# Patient Record
Sex: FEMALE | Race: WHITE | NOT HISPANIC OR LATINO | Employment: FULL TIME | URBAN - METROPOLITAN AREA
[De-identification: names, ages, dates, MRNs, and addresses within clinical notes are randomized per-mention and may not be internally consistent; named-entity substitution may affect disease eponyms.]

---

## 2022-11-09 LAB
FERRITIN: 11 UG/L (ref 8–150)
FOLATE: >23.3 NG/ML
HCT VFR BLD CALC: 36.8 % (ref 36–46)
HEMOGLOBIN: 11 G/DL (ref 12–16)
IRON SATURATION: 9 % (ref 25–45)
IRON: 42 UG/DL (ref 35–150)
TOTAL IRON BINDING CAPACITY: 453 UG/DL (ref 240–445)
VITAMIN B-12: 355 PG/ML (ref 211–911)

## 2023-08-29 ENCOUNTER — APPOINTMENT (OUTPATIENT)
Dept: PRIMARY CARE | Facility: CLINIC | Age: 43
End: 2023-08-29
Payer: COMMERCIAL

## 2023-08-31 ENCOUNTER — OFFICE VISIT (OUTPATIENT)
Dept: PRIMARY CARE | Facility: CLINIC | Age: 43
End: 2023-08-31
Payer: COMMERCIAL

## 2023-08-31 VITALS
SYSTOLIC BLOOD PRESSURE: 122 MMHG | WEIGHT: 254.4 LBS | HEIGHT: 68 IN | DIASTOLIC BLOOD PRESSURE: 83 MMHG | HEART RATE: 72 BPM | TEMPERATURE: 97.6 F | BODY MASS INDEX: 38.55 KG/M2

## 2023-08-31 DIAGNOSIS — Z09 HOSPITAL DISCHARGE FOLLOW-UP: Primary | ICD-10-CM

## 2023-08-31 DIAGNOSIS — F34.1 PERSISTENT DEPRESSIVE DISORDER: ICD-10-CM

## 2023-08-31 DIAGNOSIS — M32.9 LUPUS (MULTI): ICD-10-CM

## 2023-08-31 DIAGNOSIS — R42 VERTIGO: ICD-10-CM

## 2023-08-31 PROBLEM — M79.7 FIBROMYALGIA: Status: ACTIVE | Noted: 2018-04-19

## 2023-08-31 PROBLEM — R76.8 POSITIVE ANA (ANTINUCLEAR ANTIBODY): Status: ACTIVE | Noted: 2023-08-31

## 2023-08-31 PROBLEM — D64.9 ANEMIA: Status: ACTIVE | Noted: 2023-08-31

## 2023-08-31 PROBLEM — F32.A DEPRESSION: Status: ACTIVE | Noted: 2023-08-31

## 2023-08-31 PROCEDURE — 1036F TOBACCO NON-USER: CPT | Performed by: NURSE PRACTITIONER

## 2023-08-31 PROCEDURE — 99214 OFFICE O/P EST MOD 30 MIN: CPT | Performed by: NURSE PRACTITIONER

## 2023-08-31 RX ORDER — ONDANSETRON 4 MG/1
TABLET, ORALLY DISINTEGRATING ORAL AS NEEDED
COMMUNITY

## 2023-08-31 RX ORDER — HYDROXYZINE HYDROCHLORIDE 10 MG/1
TABLET, FILM COATED ORAL AS NEEDED
COMMUNITY
Start: 2023-06-26

## 2023-08-31 RX ORDER — DULOXETIN HYDROCHLORIDE 60 MG/1
60 CAPSULE, DELAYED RELEASE ORAL DAILY
COMMUNITY

## 2023-08-31 RX ORDER — ESTRADIOL 0.03 MG/D
FILM, EXTENDED RELEASE TRANSDERMAL
COMMUNITY
Start: 2021-09-15

## 2023-08-31 RX ORDER — DULOXETIN HYDROCHLORIDE 20 MG/1
30 CAPSULE, DELAYED RELEASE ORAL DAILY
COMMUNITY
End: 2024-01-19 | Stop reason: ALTCHOICE

## 2023-08-31 RX ORDER — OMEPRAZOLE 20 MG/1
20 CAPSULE, DELAYED RELEASE ORAL AS NEEDED
COMMUNITY
Start: 2019-04-25 | End: 2024-03-25 | Stop reason: SDUPTHER

## 2023-08-31 RX ORDER — PANTOPRAZOLE SODIUM 40 MG/1
TABLET, DELAYED RELEASE ORAL
COMMUNITY
End: 2023-12-20

## 2023-08-31 RX ORDER — MECLIZINE HYDROCHLORIDE 25 MG/1
25 TABLET ORAL AS NEEDED
COMMUNITY
Start: 2022-11-08 | End: 2024-01-19 | Stop reason: ALTCHOICE

## 2023-08-31 RX ORDER — LISDEXAMFETAMINE DIMESYLATE 50 MG/1
50 CAPSULE ORAL DAILY
COMMUNITY
Start: 2023-08-18 | End: 2024-04-19 | Stop reason: ALTCHOICE

## 2023-08-31 NOTE — PROGRESS NOTES
"Subjective   Patient ID: Marquis Skelton is a 43 y.o. female who presents for Follow-up (ER. Bp shot up to 256/190, fainted. Since then have a couple of episodes).    HPI Presents today for follow up on ER visit 8/23/2023.   Labs and imaging as noted.   Patient does have a medical marijuana card for ain associated with lupus.   Does see psychiatry for her other medications.   Went to urgent care because she felt dizzy and then was transfer to [facility] by ambulance because she had a syncopal episode. ,  EKG, blood work chest xray all normal.   Recently increased duloxetine to 40-80 mg  2-3 months ago  Has not to taken meclizine, has not needed it.   Has not taken hydroxyzine either  Has felt fine since this happened  Review of Systems   Constitutional: Negative.    Respiratory: Negative.     Cardiovascular: Negative.    Gastrointestinal: Negative.    Neurological:         As noted in HPI     Psychiatric/Behavioral:          As noted in HPI         Objective   /83   Pulse 72   Temp 36.4 °C (97.6 °F)   Ht 1.727 m (5' 8\")   Wt 115 kg (254 lb 6.4 oz)   BMI 38.68 kg/m²     Physical Exam  Constitutional:       Appearance: Normal appearance. She is obese.   HENT:      Right Ear: Tympanic membrane, ear canal and external ear normal.      Left Ear: Tympanic membrane, ear canal and external ear normal.   Cardiovascular:      Rate and Rhythm: Normal rate and regular rhythm.   Pulmonary:      Effort: Pulmonary effort is normal.      Breath sounds: Normal breath sounds.   Musculoskeletal:         General: Normal range of motion.   Neurological:      General: No focal deficit present.      Mental Status: She is alert.   Psychiatric:         Mood and Affect: Mood normal.         Behavior: Behavior normal.         Assessment/Plan   Problem List Items Addressed This Visit    None  Visit Diagnoses       Hospital discharge follow-up    -  Primary    Vertigo            Did discuss with patient interaction and " effects/interactions of medical marijuana on her there medications.

## 2023-09-01 ASSESSMENT — ENCOUNTER SYMPTOMS
CONSTITUTIONAL NEGATIVE: 1
CARDIOVASCULAR NEGATIVE: 1
RESPIRATORY NEGATIVE: 1
GASTROINTESTINAL NEGATIVE: 1

## 2023-10-17 ENCOUNTER — APPOINTMENT (OUTPATIENT)
Dept: RADIOLOGY | Facility: HOSPITAL | Age: 43
End: 2023-10-17
Payer: COMMERCIAL

## 2023-10-17 ENCOUNTER — TELEPHONE (OUTPATIENT)
Dept: PRIMARY CARE | Facility: CLINIC | Age: 43
End: 2023-10-17
Payer: COMMERCIAL

## 2023-10-17 ENCOUNTER — HOSPITAL ENCOUNTER (EMERGENCY)
Facility: HOSPITAL | Age: 43
Discharge: HOME | End: 2023-10-17
Attending: STUDENT IN AN ORGANIZED HEALTH CARE EDUCATION/TRAINING PROGRAM
Payer: COMMERCIAL

## 2023-10-17 VITALS
HEART RATE: 87 BPM | SYSTOLIC BLOOD PRESSURE: 143 MMHG | BODY MASS INDEX: 38.8 KG/M2 | TEMPERATURE: 98.1 F | OXYGEN SATURATION: 97 % | DIASTOLIC BLOOD PRESSURE: 78 MMHG | WEIGHT: 256 LBS | RESPIRATION RATE: 18 BRPM | HEIGHT: 68 IN

## 2023-10-17 DIAGNOSIS — R11.2 NAUSEA AND VOMITING, UNSPECIFIED VOMITING TYPE: Primary | ICD-10-CM

## 2023-10-17 DIAGNOSIS — R00.2 PALPITATIONS: ICD-10-CM

## 2023-10-17 LAB
ALBUMIN SERPL BCP-MCNC: 4 G/DL (ref 3.4–5)
ALP SERPL-CCNC: 82 U/L (ref 33–110)
ALT SERPL W P-5'-P-CCNC: 18 U/L (ref 7–45)
ANION GAP SERPL CALC-SCNC: 11 MMOL/L (ref 10–20)
AST SERPL W P-5'-P-CCNC: 17 U/L (ref 9–39)
BASOPHILS # BLD AUTO: 0.08 X10*3/UL (ref 0–0.1)
BASOPHILS NFR BLD AUTO: 0.8 %
BILIRUB SERPL-MCNC: 0.3 MG/DL (ref 0–1.2)
BUN SERPL-MCNC: 16 MG/DL (ref 6–23)
CALCIUM SERPL-MCNC: 9.3 MG/DL (ref 8.6–10.3)
CARDIAC TROPONIN I PNL SERPL HS: 3 NG/L (ref 0–13)
CHLORIDE SERPL-SCNC: 106 MMOL/L (ref 98–107)
CO2 SERPL-SCNC: 24 MMOL/L (ref 21–32)
CREAT SERPL-MCNC: 0.78 MG/DL (ref 0.5–1.05)
EOSINOPHIL # BLD AUTO: 0.25 X10*3/UL (ref 0–0.7)
EOSINOPHIL NFR BLD AUTO: 2.6 %
ERYTHROCYTE [DISTWIDTH] IN BLOOD BY AUTOMATED COUNT: 18.2 % (ref 11.5–14.5)
GFR SERPL CREATININE-BSD FRML MDRD: >90 ML/MIN/1.73M*2
GLUCOSE SERPL-MCNC: 88 MG/DL (ref 74–99)
HCT VFR BLD AUTO: 39.2 % (ref 36–46)
HGB BLD-MCNC: 12.2 G/DL (ref 12–16)
IMM GRANULOCYTES # BLD AUTO: 0.02 X10*3/UL (ref 0–0.7)
IMM GRANULOCYTES NFR BLD AUTO: 0.2 % (ref 0–0.9)
LYMPHOCYTES # BLD AUTO: 2.6 X10*3/UL (ref 1.2–4.8)
LYMPHOCYTES NFR BLD AUTO: 27.2 %
MCH RBC QN AUTO: 24.8 PG (ref 26–34)
MCHC RBC AUTO-ENTMCNC: 31.1 G/DL (ref 32–36)
MCV RBC AUTO: 80 FL (ref 80–100)
MONOCYTES # BLD AUTO: 0.72 X10*3/UL (ref 0.1–1)
MONOCYTES NFR BLD AUTO: 7.5 %
NEUTROPHILS # BLD AUTO: 5.9 X10*3/UL (ref 1.2–7.7)
NEUTROPHILS NFR BLD AUTO: 61.7 %
NRBC BLD-RTO: 0 /100 WBCS (ref 0–0)
PLATELET # BLD AUTO: 310 X10*3/UL (ref 150–450)
PMV BLD AUTO: 10.3 FL (ref 7.5–11.5)
POTASSIUM SERPL-SCNC: 3.9 MMOL/L (ref 3.5–5.3)
PROT SERPL-MCNC: 6.6 G/DL (ref 6.4–8.2)
RBC # BLD AUTO: 4.91 X10*6/UL (ref 4–5.2)
SODIUM SERPL-SCNC: 137 MMOL/L (ref 136–145)
WBC # BLD AUTO: 9.6 X10*3/UL (ref 4.4–11.3)

## 2023-10-17 PROCEDURE — 84484 ASSAY OF TROPONIN QUANT: CPT

## 2023-10-17 PROCEDURE — 80053 COMPREHEN METABOLIC PANEL: CPT

## 2023-10-17 PROCEDURE — 71046 X-RAY EXAM CHEST 2 VIEWS: CPT | Performed by: RADIOLOGY

## 2023-10-17 PROCEDURE — 99283 EMERGENCY DEPT VISIT LOW MDM: CPT | Mod: 25

## 2023-10-17 PROCEDURE — 71046 X-RAY EXAM CHEST 2 VIEWS: CPT

## 2023-10-17 PROCEDURE — 99284 EMERGENCY DEPT VISIT MOD MDM: CPT | Performed by: STUDENT IN AN ORGANIZED HEALTH CARE EDUCATION/TRAINING PROGRAM

## 2023-10-17 PROCEDURE — 36415 COLL VENOUS BLD VENIPUNCTURE: CPT

## 2023-10-17 PROCEDURE — 85025 COMPLETE CBC W/AUTO DIFF WBC: CPT

## 2023-10-17 ASSESSMENT — COLUMBIA-SUICIDE SEVERITY RATING SCALE - C-SSRS
1. IN THE PAST MONTH, HAVE YOU WISHED YOU WERE DEAD OR WISHED YOU COULD GO TO SLEEP AND NOT WAKE UP?: NO
2. HAVE YOU ACTUALLY HAD ANY THOUGHTS OF KILLING YOURSELF?: NO
6. HAVE YOU EVER DONE ANYTHING, STARTED TO DO ANYTHING, OR PREPARED TO DO ANYTHING TO END YOUR LIFE?: NO

## 2023-10-17 NOTE — ED PROVIDER NOTES
"  Chief Complaint   Patient presents with    Nausea    Chest Pain     STATES FEELS \"SHAKY, NAUSEA, CHEST PRESSURE AND THROAT TIGHTNESS FEELING\" PT IS SPEAKING IN FULL SENTNECES       43-year-old female arrives to the emergency department with a chief complaint of intermittent episodes of palpitations and chest pressure.  Patient states that 2-3 times a week she has an abrupt onset of where it feels as if her heart is going to beat out of her chest, she has a heaviness/pressure on her chest, and states that she has a difficult time catching her breath at those times.  States that it can last from 1 to 5 hours, that nothing in particular brings it on, the patient is not currently taking any medications for this.  The patient has followed up with her primary care provider as well as her psychiatrist in relation to this, she called her primary care today while she was having 1 of these episodes and was told to come to the emergency department.  The patient upon initial assessment states that the episode has completely resolved.  The patient is medicated on a daily basis for anxiety/depression, her dose was adjusted 4 months ago, patient has a as needed anxiety medication that she does not take.  When the patient's symptoms resolved, they completely resolve with no residual and they are not persistent.  Patient denies any other symptoms or complaints, patient is tearful upon initial assessment.  Patient denies any SI or HI.      History provided by:  Patient   used: No         PmHx, PsHx, Allergies, Family Hx, social Hx reviewed as documented    A complete 10 point review of systems was performed and is negative except for as mentioned in the HPI.    Physical Exam:    General: Patient is AAOx3, appears well developed, well nourished, is a good historian, answers questions appropriately    HEENT: head normocephalic, atraumatic, PERRLA, EOMs intact, oropharynx without erythema or exudate, buccal mucosa " intact without lesions, TMs unremarkable, nose is patent bilateral    Neck: supple, full ROM, negative for lymphadenopathy, JVD, thyromegaly, tracheal deviation, nuccal rigidity    Pulmonary: CTAB, no accessory muscle use, able to speak full clear sentences    Cardiac: HRRR, no murmurs, rubs or gallops    GI: soft, non-tender, non-distended, BS + x 4, no masses or organomegaly, no guarding or CVA tenderness noted, negative aguilar's, mcburney's    Musculoskeletal: full weight bearing, BRASWELL, no joint effusions, clubbing or edema noted    Skin: intact, no lesions or rashes noted, turgor is good.    Neuro: patient follow commands, cranial nerves 2-12 grossly intact, motor strengths 5/5 upper and lower extremities, DTR's and sensation are symmetrical. No focal deficits.    Rectal/: No urinary burning, urgency, change in frequency.  Patient has no rectal complaints        Medical Decision Making  This patient was seen, treated, and evaluated in conjunction with Dr. Imtiaz Petty    My initial impression for this patient is that she is having mild and repeating anxiety attacks/stress responses.  However other consideration for this patient would be atypical ACS, electrolyte abnormalities, blood count abnormalities.     EKG, diagnostic blood work, chest x-ray will be used to further evaluate    The patient's EKG was done at 1624 on 10/17, the EKG was interpreted by the attending physician as no STEMI, the EKG was interpreted by me as sinus rhythm with a rate of 88 a MI interval of 141 and a QTc of 434, the EKG is without acute ST abnormality or ectopy    The attending physician to further evaluate and disposition patient            The patient has had the following imaging during this ER visit: XR CHEST 1 VIEW     Patient History   Past Medical History:   Diagnosis Date    Personal history of other diseases of the female genital tract 06/10/2021    History of endometriosis    Personal history of other diseases of  "the musculoskeletal system and connective tissue 06/10/2021    History of fibromyalgia    Personal history of peptic ulcer disease 06/10/2021    History of gastric ulcer     Past Surgical History:   Procedure Laterality Date    OTHER SURGICAL HISTORY  06/10/2021    Hysterectomy    OTHER SURGICAL HISTORY  06/10/2021    Laparoscopy    OTHER SURGICAL HISTORY  06/10/2021    Gallbladder surgery    OTHER SURGICAL HISTORY  06/10/2021    Nose surgery     Family History   Problem Relation Name Age of Onset    Other (cva) Mother      Diabetes Mother      Thyroid disease Mother      Pancreatic cancer Father       Social History     Tobacco Use    Smoking status: Never     Passive exposure: Never    Smokeless tobacco: Never   Vaping Use    Vaping Use: Never used   Substance Use Topics    Alcohol use: Not on file    Drug use: Not on file       ED Triage Vitals   Temp Heart Rate Resp BP   10/17/23 1622 10/17/23 1623 10/17/23 1623 10/17/23 1623   36.7 °C (98.1 °F) 87 18 143/78      SpO2 Temp Source Heart Rate Source Patient Position   10/17/23 1623 10/17/23 1622 -- --   97 % Temporal        BP Location FiO2 (%)     10/17/23 1623 --     Left arm        Vitals:    10/17/23 1622 10/17/23 1623   BP:  143/78   BP Location:  Left arm   Pulse:  87   Resp:  18   Temp: 36.7 °C (98.1 °F)    TempSrc: Temporal    SpO2:  97%   Weight:  116 kg (256 lb)   Height:  1.727 m (5' 8\")               YOSEPH Ross-CNP  10/17/23 1829    "

## 2023-10-17 NOTE — TELEPHONE ENCOUNTER
PT called regarding HBP and and heart racing.  She feels pressure in her heart even upon swallowing.  She also feels very weak.  She told me that as she is sitting talking to me she is very shaky.  I spoke with Dr. Kimbrough, she said with the symptoms the patient is having she needs to go to the ER, she should call 911.  This was expressed to the patient who was concerned if she went to ER they were going to just tell her to follow up with her PCP - so I did go a head and schedule the patient with Carlota Saleh so that she would have an appt.      Of note, patient was seen in the ER in August for same/similar symptoms and was told she had vertigo.

## 2023-10-23 ENCOUNTER — TELEPHONE (OUTPATIENT)
Dept: PRIMARY CARE | Facility: CLINIC | Age: 43
End: 2023-10-23

## 2023-10-23 ENCOUNTER — APPOINTMENT (OUTPATIENT)
Dept: PRIMARY CARE | Facility: CLINIC | Age: 43
End: 2023-10-23
Payer: COMMERCIAL

## 2023-10-23 PROBLEM — F32.9 MAJOR DEPRESSION: Status: ACTIVE | Noted: 2023-08-31

## 2023-10-23 NOTE — TELEPHONE ENCOUNTER
Patient went to ER and all tests came back good. They believe her high heart rate is from anxiety. Her therapist put her on Hydroxyzine 10mg to see how she does.  She is having all over body pain (a 6 or 7 on pain level) from her lupus.

## 2023-12-08 ENCOUNTER — APPOINTMENT (OUTPATIENT)
Dept: PRIMARY CARE | Facility: CLINIC | Age: 43
End: 2023-12-08
Payer: COMMERCIAL

## 2023-12-20 DIAGNOSIS — K21.9 GASTROESOPHAGEAL REFLUX DISEASE WITHOUT ESOPHAGITIS: Primary | ICD-10-CM

## 2023-12-20 RX ORDER — PANTOPRAZOLE SODIUM 40 MG/1
40 TABLET, DELAYED RELEASE ORAL
Qty: 90 TABLET | Refills: 3 | Status: SHIPPED | OUTPATIENT
Start: 2023-12-20 | End: 2024-03-26 | Stop reason: SDUPTHER

## 2024-01-19 ENCOUNTER — OFFICE VISIT (OUTPATIENT)
Dept: PRIMARY CARE | Facility: CLINIC | Age: 44
End: 2024-01-19
Payer: COMMERCIAL

## 2024-01-19 VITALS
HEIGHT: 68 IN | DIASTOLIC BLOOD PRESSURE: 80 MMHG | WEIGHT: 264 LBS | BODY MASS INDEX: 40.01 KG/M2 | SYSTOLIC BLOOD PRESSURE: 120 MMHG | HEART RATE: 75 BPM

## 2024-01-19 DIAGNOSIS — R40.0 DAYTIME SOMNOLENCE: ICD-10-CM

## 2024-01-19 DIAGNOSIS — Z13.220 LIPID SCREENING: ICD-10-CM

## 2024-01-19 DIAGNOSIS — R00.2 PALPITATIONS: Primary | ICD-10-CM

## 2024-01-19 DIAGNOSIS — R06.83 HABITUAL SNORING: ICD-10-CM

## 2024-01-19 DIAGNOSIS — Z00.00 HEALTHCARE MAINTENANCE: ICD-10-CM

## 2024-01-19 PROBLEM — H61.20 IMPACTED CERUMEN: Status: RESOLVED | Noted: 2024-01-19 | Resolved: 2024-01-19

## 2024-01-19 PROBLEM — R29.898 DECREASED RANGE OF MOTION OF NECK: Status: RESOLVED | Noted: 2024-01-19 | Resolved: 2024-01-19

## 2024-01-19 PROBLEM — S39.012A STRAIN OF LUMBAR REGION: Status: RESOLVED | Noted: 2024-01-19 | Resolved: 2024-01-19

## 2024-01-19 PROBLEM — M54.50 LOW BACK PAIN, UNSPECIFIED: Status: RESOLVED | Noted: 2022-08-01 | Resolved: 2024-01-19

## 2024-01-19 PROBLEM — R23.2 FLUSHING: Status: RESOLVED | Noted: 2024-01-19 | Resolved: 2024-01-19

## 2024-01-19 PROBLEM — M62.838 MUSCLE SPASMS OF NECK: Status: RESOLVED | Noted: 2024-01-19 | Resolved: 2024-01-19

## 2024-01-19 PROBLEM — R13.10 DYSPHAGIA: Status: RESOLVED | Noted: 2024-01-19 | Resolved: 2024-01-19

## 2024-01-19 PROBLEM — L93.0 LUPUS ERYTHEMATOSUS: Status: ACTIVE | Noted: 2019-09-24

## 2024-01-19 PROBLEM — R11.0 NAUSEA: Status: RESOLVED | Noted: 2023-08-23 | Resolved: 2024-01-19

## 2024-01-19 PROBLEM — M25.50 ARTHRALGIA OF MULTIPLE JOINTS: Status: RESOLVED | Noted: 2024-01-19 | Resolved: 2024-01-19

## 2024-01-19 PROBLEM — E28.39 DECREASED ESTROGEN LEVEL: Status: ACTIVE | Noted: 2024-01-19

## 2024-01-19 PROBLEM — K21.9 GASTROESOPHAGEAL REFLUX DISEASE: Status: ACTIVE | Noted: 2024-01-19

## 2024-01-19 PROBLEM — H92.03 OTALGIA OF BOTH EARS: Status: RESOLVED | Noted: 2024-01-19 | Resolved: 2024-01-19

## 2024-01-19 PROBLEM — R42 DIZZINESS: Status: RESOLVED | Noted: 2023-08-23 | Resolved: 2024-01-19

## 2024-01-19 PROBLEM — M54.50 LOW BACK PAIN: Status: RESOLVED | Noted: 2024-01-19 | Resolved: 2024-01-19

## 2024-01-19 PROBLEM — K92.1 MELENA: Status: RESOLVED | Noted: 2024-01-19 | Resolved: 2024-01-19

## 2024-01-19 PROBLEM — E66.01 MORBID OBESITY (MULTI): Status: ACTIVE | Noted: 2024-01-19

## 2024-01-19 PROBLEM — R10.9 ACUTE ABDOMINAL PAIN: Status: RESOLVED | Noted: 2019-09-24 | Resolved: 2024-01-19

## 2024-01-19 PROBLEM — R76.8 POSITIVE ANTINUCLEAR ANTIBODY: Status: ACTIVE | Noted: 2020-07-13

## 2024-01-19 PROCEDURE — 99214 OFFICE O/P EST MOD 30 MIN: CPT | Performed by: FAMILY MEDICINE

## 2024-01-19 PROCEDURE — 1036F TOBACCO NON-USER: CPT | Performed by: FAMILY MEDICINE

## 2024-01-19 RX ORDER — DULOXETIN HYDROCHLORIDE 30 MG/1
30 CAPSULE, DELAYED RELEASE ORAL DAILY
COMMUNITY

## 2024-01-19 RX ORDER — EVENING PRIMROSE OIL 500 MG
CAPSULE ORAL DAILY
COMMUNITY

## 2024-01-19 ASSESSMENT — ANXIETY QUESTIONNAIRES
3. WORRYING TOO MUCH ABOUT DIFFERENT THINGS: MORE THAN HALF THE DAYS
IF YOU CHECKED OFF ANY PROBLEMS ON THIS QUESTIONNAIRE, HOW DIFFICULT HAVE THESE PROBLEMS MADE IT FOR YOU TO DO YOUR WORK, TAKE CARE OF THINGS AT HOME, OR GET ALONG WITH OTHER PEOPLE: VERY DIFFICULT
5. BEING SO RESTLESS THAT IT IS HARD TO SIT STILL: MORE THAN HALF THE DAYS
1. FEELING NERVOUS, ANXIOUS, OR ON EDGE: SEVERAL DAYS
6. BECOMING EASILY ANNOYED OR IRRITABLE: NOT AT ALL
GAD7 TOTAL SCORE: 9
2. NOT BEING ABLE TO STOP OR CONTROL WORRYING: MORE THAN HALF THE DAYS
7. FEELING AFRAID AS IF SOMETHING AWFUL MIGHT HAPPEN: NOT AT ALL
4. TROUBLE RELAXING: MORE THAN HALF THE DAYS

## 2024-01-19 ASSESSMENT — COLUMBIA-SUICIDE SEVERITY RATING SCALE - C-SSRS
1. IN THE PAST MONTH, HAVE YOU WISHED YOU WERE DEAD OR WISHED YOU COULD GO TO SLEEP AND NOT WAKE UP?: YES
2. HAVE YOU ACTUALLY HAD ANY THOUGHTS OF KILLING YOURSELF?: NO
6. HAVE YOU EVER DONE ANYTHING, STARTED TO DO ANYTHING, OR PREPARED TO DO ANYTHING TO END YOUR LIFE?: NO

## 2024-01-19 ASSESSMENT — PATIENT HEALTH QUESTIONNAIRE - PHQ9
SUM OF ALL RESPONSES TO PHQ QUESTIONS 1-9: 20
7. TROUBLE CONCENTRATING ON THINGS, SUCH AS READING THE NEWSPAPER OR WATCHING TELEVISION: NEARLY EVERY DAY
3. TROUBLE FALLING OR STAYING ASLEEP OR SLEEPING TOO MUCH: NEARLY EVERY DAY
8. MOVING OR SPEAKING SO SLOWLY THAT OTHER PEOPLE COULD HAVE NOTICED. OR THE OPPOSITE, BEING SO FIGETY OR RESTLESS THAT YOU HAVE BEEN MOVING AROUND A LOT MORE THAN USUAL: SEVERAL DAYS
5. POOR APPETITE OR OVEREATING: NOT AT ALL
10. IF YOU CHECKED OFF ANY PROBLEMS, HOW DIFFICULT HAVE THESE PROBLEMS MADE IT FOR YOU TO DO YOUR WORK, TAKE CARE OF THINGS AT HOME, OR GET ALONG WITH OTHER PEOPLE: SOMEWHAT DIFFICULT
4. FEELING TIRED OR HAVING LITTLE ENERGY: NEARLY EVERY DAY
1. LITTLE INTEREST OR PLEASURE IN DOING THINGS: NEARLY EVERY DAY
2. FEELING DOWN, DEPRESSED OR HOPELESS: NEARLY EVERY DAY
6. FEELING BAD ABOUT YOURSELF - OR THAT YOU ARE A FAILURE OR HAVE LET YOURSELF OR YOUR FAMILY DOWN: NEARLY EVERY DAY
SUM OF ALL RESPONSES TO PHQ9 QUESTIONS 1 AND 2: 6
9. THOUGHTS THAT YOU WOULD BE BETTER OFF DEAD, OR OF HURTING YOURSELF: SEVERAL DAYS

## 2024-01-19 ASSESSMENT — ENCOUNTER SYMPTOMS
OCCASIONAL FEELINGS OF UNSTEADINESS: 0
LOSS OF SENSATION IN FEET: 0
DEPRESSION: 1

## 2024-01-19 NOTE — PATIENT INSTRUCTIONS
1. Palpitations  - Holter or Event Cardiac Monitor; Future    2. Habitual snoring  - Home sleep apnea test (HSAT); Future    3. Daytime somnolence  - Home sleep apnea test (HSAT); Future    4. Lipid screening  - Lipid Panel; Future    5. Healthcare maintenance  - TSH with reflex to Free T4 if abnormal; Future  - Comprehensive Metabolic Panel; Future  - CBC and Auto Differential; Future

## 2024-01-19 NOTE — PROGRESS NOTES
"Subjective   Patient ID: Marquis Skelton is a 43 y.o. female who presents for [facility] (Establish care/).    42 y/o female presents to establish care    Has been experiencing episodes of palpitations with elevated blood pressure with no prior history of hypertension. Has been to ER twice for these episodes most recently 10/17/2023 with negative cardiac work up. Does report these episodes occur about twice a week with no specific triggers. Endorses accompanying chest tightness and shortness of breath lasting anywhere from several minutes to as much as 5 hours and symptoms resolve on their own. She was instructed to try hydroxyzine when experiencing these episodes which she has done a couple of times but does not feel it made any difference. Does follow with psychiatry every 1-3 months, counselor every 2 weeks which she started over a year ago due to past traumas which have been helping. Most recently has been dealing with increase in life stressors so this has been hard on her, she is working with her counselor on this. Does have thoughts that people would be better off if she was not here but does not have any suicidal ideation or plan. States she would never do anything like that to her children.     Also endorses daytime fatigue and does not feel she is rested upon waking, report frequent headaches stating she gets them most days.  has stated that she snores.     Is due for fasting blood work.          Review of Systems   All other systems reviewed and are negative.      Objective   /80 (BP Location: Right arm, Patient Position: Sitting)   Pulse 75   Ht 1.727 m (5' 8\")   Wt 120 kg (264 lb)   BMI 40.14 kg/m²     Physical Exam  Constitutional:       Appearance: Normal appearance.   HENT:      Head: Normocephalic and atraumatic.   Cardiovascular:      Rate and Rhythm: Normal rate and regular rhythm.      Heart sounds: No murmur heard.     No gallop.   Pulmonary:      Effort: Pulmonary effort " is normal. No respiratory distress.      Breath sounds: Normal breath sounds.   Abdominal:      General: Bowel sounds are normal. There is no distension.      Tenderness: There is no abdominal tenderness.   Musculoskeletal:         General: Normal range of motion.   Skin:     General: Skin is warm and dry.      Findings: No lesion or rash.   Neurological:      General: No focal deficit present.      Mental Status: She is alert and oriented to person, place, and time. Mental status is at baseline.   Psychiatric:         Mood and Affect: Mood normal. Affect is tearful.         Behavior: Behavior normal.         Assessment/Plan   Problem List Items Addressed This Visit             ICD-10-CM    RESOLVED: Palpitations - Primary R00.2    Relevant Orders    Holter or Event Cardiac Monitor    Follow Up In [facility] - PCP - Established     Other Visit Diagnoses         Codes    Habitual snoring     R06.83    Relevant Orders    Home sleep apnea test (HSAT)    Daytime somnolence     R40.0    Relevant Orders    Home sleep apnea test (HSAT)    Follow Up In [facility] - PCP - Established    Lipid screening     Z13.220    Relevant Orders    Lipid Panel    Follow Up In [facility] - PCP - Established    Healthcare maintenance     Z00.00    Relevant Orders    TSH with reflex to Free T4 if abnormal    Comprehensive Metabolic Panel    CBC and Auto Differential

## 2024-01-30 ENCOUNTER — HOSPITAL ENCOUNTER (OUTPATIENT)
Dept: CARDIOLOGY | Facility: HOSPITAL | Age: 44
Discharge: HOME | End: 2024-01-30
Payer: COMMERCIAL

## 2024-01-30 DIAGNOSIS — R00.2 PALPITATIONS: ICD-10-CM

## 2024-01-30 PROCEDURE — 93244 EXT ECG>48HR<7D REV&INTERPJ: CPT | Performed by: STUDENT IN AN ORGANIZED HEALTH CARE EDUCATION/TRAINING PROGRAM

## 2024-01-30 PROCEDURE — 93242 EXT ECG>48HR<7D RECORDING: CPT

## 2024-02-05 ENCOUNTER — APPOINTMENT (OUTPATIENT)
Dept: SLEEP MEDICINE | Facility: CLINIC | Age: 44
End: 2024-02-05
Payer: COMMERCIAL

## 2024-02-08 ENCOUNTER — LAB (OUTPATIENT)
Dept: LAB | Facility: LAB | Age: 44
End: 2024-02-08
Payer: COMMERCIAL

## 2024-02-08 ENCOUNTER — CLINICAL SUPPORT (OUTPATIENT)
Dept: SLEEP MEDICINE | Facility: CLINIC | Age: 44
End: 2024-02-08
Payer: COMMERCIAL

## 2024-02-08 DIAGNOSIS — Z13.220 LIPID SCREENING: ICD-10-CM

## 2024-02-08 DIAGNOSIS — G47.33 OBSTRUCTIVE SLEEP APNEA (ADULT) (PEDIATRIC): ICD-10-CM

## 2024-02-08 DIAGNOSIS — Z00.00 HEALTHCARE MAINTENANCE: ICD-10-CM

## 2024-02-08 DIAGNOSIS — R40.0 DAYTIME SOMNOLENCE: ICD-10-CM

## 2024-02-08 DIAGNOSIS — R06.83 HABITUAL SNORING: ICD-10-CM

## 2024-02-08 LAB
ALBUMIN SERPL BCP-MCNC: 4.1 G/DL (ref 3.4–5)
ALP SERPL-CCNC: 80 U/L (ref 33–110)
ALT SERPL W P-5'-P-CCNC: 20 U/L (ref 7–45)
ANION GAP SERPL CALC-SCNC: 14 MMOL/L (ref 10–20)
AST SERPL W P-5'-P-CCNC: 20 U/L (ref 9–39)
BASOPHILS # BLD AUTO: 0.07 X10*3/UL (ref 0–0.1)
BASOPHILS NFR BLD AUTO: 0.9 %
BILIRUB SERPL-MCNC: 0.4 MG/DL (ref 0–1.2)
BUN SERPL-MCNC: 13 MG/DL (ref 6–23)
CALCIUM SERPL-MCNC: 9.1 MG/DL (ref 8.6–10.3)
CHLORIDE SERPL-SCNC: 104 MMOL/L (ref 98–107)
CHOLEST SERPL-MCNC: 259 MG/DL (ref 0–199)
CHOLESTEROL/HDL RATIO: 3.8
CO2 SERPL-SCNC: 24 MMOL/L (ref 21–32)
CREAT SERPL-MCNC: 0.82 MG/DL (ref 0.5–1.05)
EGFRCR SERPLBLD CKD-EPI 2021: >90 ML/MIN/1.73M*2
EOSINOPHIL # BLD AUTO: 0.35 X10*3/UL (ref 0–0.7)
EOSINOPHIL NFR BLD AUTO: 4.5 %
ERYTHROCYTE [DISTWIDTH] IN BLOOD BY AUTOMATED COUNT: 18.4 % (ref 11.5–14.5)
GLUCOSE SERPL-MCNC: 82 MG/DL (ref 74–99)
HCT VFR BLD AUTO: 41.5 % (ref 36–46)
HDLC SERPL-MCNC: 68.3 MG/DL
HGB BLD-MCNC: 12.6 G/DL (ref 12–16)
IMM GRANULOCYTES # BLD AUTO: 0.04 X10*3/UL (ref 0–0.7)
IMM GRANULOCYTES NFR BLD AUTO: 0.5 % (ref 0–0.9)
LDLC SERPL CALC-MCNC: 163 MG/DL
LYMPHOCYTES # BLD AUTO: 2.11 X10*3/UL (ref 1.2–4.8)
LYMPHOCYTES NFR BLD AUTO: 27.4 %
MCH RBC QN AUTO: 24.8 PG (ref 26–34)
MCHC RBC AUTO-ENTMCNC: 30.4 G/DL (ref 32–36)
MCV RBC AUTO: 82 FL (ref 80–100)
MONOCYTES # BLD AUTO: 0.5 X10*3/UL (ref 0.1–1)
MONOCYTES NFR BLD AUTO: 6.5 %
NEUTROPHILS # BLD AUTO: 4.63 X10*3/UL (ref 1.2–7.7)
NEUTROPHILS NFR BLD AUTO: 60.2 %
NON HDL CHOLESTEROL: 191 MG/DL (ref 0–149)
NRBC BLD-RTO: 0 /100 WBCS (ref 0–0)
PLATELET # BLD AUTO: 400 X10*3/UL (ref 150–450)
POTASSIUM SERPL-SCNC: 4.8 MMOL/L (ref 3.5–5.3)
PROT SERPL-MCNC: 7 G/DL (ref 6.4–8.2)
RBC # BLD AUTO: 5.08 X10*6/UL (ref 4–5.2)
SODIUM SERPL-SCNC: 137 MMOL/L (ref 136–145)
TRIGL SERPL-MCNC: 137 MG/DL (ref 0–149)
TSH SERPL-ACNC: 1.47 MIU/L (ref 0.44–3.98)
VLDL: 27 MG/DL (ref 0–40)
WBC # BLD AUTO: 7.7 X10*3/UL (ref 4.4–11.3)

## 2024-02-08 PROCEDURE — 95806 SLEEP STUDY UNATT&RESP EFFT: CPT | Performed by: PSYCHIATRY & NEUROLOGY

## 2024-02-08 PROCEDURE — 84443 ASSAY THYROID STIM HORMONE: CPT

## 2024-02-08 PROCEDURE — 85025 COMPLETE CBC W/AUTO DIFF WBC: CPT

## 2024-02-08 PROCEDURE — 80061 LIPID PANEL: CPT

## 2024-02-08 PROCEDURE — 80053 COMPREHEN METABOLIC PANEL: CPT

## 2024-02-08 PROCEDURE — 36415 COLL VENOUS BLD VENIPUNCTURE: CPT

## 2024-02-08 NOTE — PROGRESS NOTES
Type of Study: HOME SLEEP STUDY - NOMAD     The patient received equipment and instructions for use of the [employer] [facility] Nomad HSAT device. The patient was instructed how to apply the effort belts, cannula, thermistor. It was also explained how the Nomad and oximeter components work.  The patient was asked to record their sleep for an 8-hour period.     The patient was informed of their responsibility for the device and acknowledged this by signing the HSAT device contract. The patient was asked to return the device on 02/09/2024 by 10 AM to [facility] Respiratory Therapy department.     The patient was instructed to call 911 as usual for any medical- emergencies while at home.

## 2024-02-13 NOTE — RESULT ENCOUNTER NOTE
Sleep study is consistent with severe sleep apnea, it is recommended that you get an in lab sleep study done so that we can obtain your sleep machine settings. This can be done in [facility] sleep lab. Just let me know if you are agreeable to this and I will get the order in.

## 2024-02-14 ENCOUNTER — TELEPHONE (OUTPATIENT)
Dept: PRIMARY CARE | Facility: CLINIC | Age: 44
End: 2024-02-14
Payer: COMMERCIAL

## 2024-02-14 DIAGNOSIS — Z13.6 SCREENING FOR CARDIOVASCULAR CONDITION: Primary | ICD-10-CM

## 2024-02-14 DIAGNOSIS — G47.33 OSA (OBSTRUCTIVE SLEEP APNEA): Primary | ICD-10-CM

## 2024-02-14 NOTE — TELEPHONE ENCOUNTER
----- Message from Odilia Hernandez APRN-CNP sent at 2/13/2024 10:19 AM EST -----  Sleep study is consistent with severe sleep apnea, it is recommended that you get an in lab sleep study done so that we can obtain your sleep machine settings. This can be done in [facility] sleep lab. Just let me know if you are agreeable to this and I will get the order in.

## 2024-02-14 NOTE — TELEPHONE ENCOUNTER
Odilia Hernandez, APRN-CHERISE Robin MA  Caller: Unspecified (Today, 10:28 AM)  Sleep study ordered

## 2024-02-14 NOTE — TELEPHONE ENCOUNTER
Patient notified   Tolerating oral intake and sitting up on side of bed. Discharge instructions given to patient and fiance. Verbalize understanding. Awaiting ride. No complaints.

## 2024-02-14 NOTE — TELEPHONE ENCOUNTER
Patient notified and is willing to do whatever is necessary, also asked about lab results please advise

## 2024-02-14 NOTE — RESULT ENCOUNTER NOTE
Cholesterol was a bit high- for now I recommend lifestyle measures to improve cholesterol which include regular exercise (150 minutes of activity that produces sweat and increases heart rate per week). Healthy diet low in fat, low cholesterol. I would consider a CT cardiac scoring screening to assess whether there is plaque build up in the arteries of your heart. All other blood work is normal.

## 2024-02-29 ENCOUNTER — HOSPITAL ENCOUNTER (EMERGENCY)
Facility: HOSPITAL | Age: 44
Discharge: HOME | End: 2024-02-29
Attending: EMERGENCY MEDICINE
Payer: COMMERCIAL

## 2024-02-29 ENCOUNTER — TELEPHONE (OUTPATIENT)
Dept: PRIMARY CARE | Facility: CLINIC | Age: 44
End: 2024-02-29
Payer: COMMERCIAL

## 2024-02-29 ENCOUNTER — APPOINTMENT (OUTPATIENT)
Dept: RADIOLOGY | Facility: HOSPITAL | Age: 44
End: 2024-02-29
Payer: COMMERCIAL

## 2024-02-29 ENCOUNTER — APPOINTMENT (OUTPATIENT)
Dept: CARDIOLOGY | Facility: HOSPITAL | Age: 44
End: 2024-02-29
Payer: COMMERCIAL

## 2024-02-29 VITALS
HEIGHT: 68 IN | DIASTOLIC BLOOD PRESSURE: 98 MMHG | RESPIRATION RATE: 18 BRPM | BODY MASS INDEX: 40.16 KG/M2 | WEIGHT: 265 LBS | HEART RATE: 80 BPM | TEMPERATURE: 98 F | OXYGEN SATURATION: 99 % | SYSTOLIC BLOOD PRESSURE: 158 MMHG

## 2024-02-29 DIAGNOSIS — R07.9 CHEST PAIN, UNSPECIFIED TYPE: Primary | ICD-10-CM

## 2024-02-29 LAB
ANION GAP SERPL CALC-SCNC: 12 MMOL/L (ref 10–20)
BASOPHILS # BLD AUTO: 0.09 X10*3/UL (ref 0–0.1)
BASOPHILS NFR BLD AUTO: 1.1 %
BNP SERPL-MCNC: 11 PG/ML (ref 0–99)
BUN SERPL-MCNC: 17 MG/DL (ref 6–23)
CALCIUM SERPL-MCNC: 9.2 MG/DL (ref 8.6–10.3)
CARDIAC TROPONIN I PNL SERPL HS: <3 NG/L (ref 0–13)
CARDIAC TROPONIN I PNL SERPL HS: <3 NG/L (ref 0–13)
CHLORIDE SERPL-SCNC: 102 MMOL/L (ref 98–107)
CO2 SERPL-SCNC: 27 MMOL/L (ref 21–32)
CREAT SERPL-MCNC: 0.84 MG/DL (ref 0.5–1.05)
EGFRCR SERPLBLD CKD-EPI 2021: 88 ML/MIN/1.73M*2
EOSINOPHIL # BLD AUTO: 0.29 X10*3/UL (ref 0–0.7)
EOSINOPHIL NFR BLD AUTO: 3.5 %
ERYTHROCYTE [DISTWIDTH] IN BLOOD BY AUTOMATED COUNT: 17.9 % (ref 11.5–14.5)
GLUCOSE SERPL-MCNC: 87 MG/DL (ref 74–99)
HCT VFR BLD AUTO: 41 % (ref 36–46)
HGB BLD-MCNC: 13.3 G/DL (ref 12–16)
IMM GRANULOCYTES # BLD AUTO: 0.03 X10*3/UL (ref 0–0.7)
IMM GRANULOCYTES NFR BLD AUTO: 0.4 % (ref 0–0.9)
LYMPHOCYTES # BLD AUTO: 2.51 X10*3/UL (ref 1.2–4.8)
LYMPHOCYTES NFR BLD AUTO: 30.2 %
MAGNESIUM SERPL-MCNC: 1.79 MG/DL (ref 1.6–2.4)
MCH RBC QN AUTO: 25.7 PG (ref 26–34)
MCHC RBC AUTO-ENTMCNC: 32.4 G/DL (ref 32–36)
MCV RBC AUTO: 79 FL (ref 80–100)
MONOCYTES # BLD AUTO: 0.52 X10*3/UL (ref 0.1–1)
MONOCYTES NFR BLD AUTO: 6.3 %
NEUTROPHILS # BLD AUTO: 4.86 X10*3/UL (ref 1.2–7.7)
NEUTROPHILS NFR BLD AUTO: 58.5 %
NRBC BLD-RTO: 0 /100 WBCS (ref 0–0)
PLATELET # BLD AUTO: 342 X10*3/UL (ref 150–450)
POTASSIUM SERPL-SCNC: 4.1 MMOL/L (ref 3.5–5.3)
RBC # BLD AUTO: 5.17 X10*6/UL (ref 4–5.2)
SODIUM SERPL-SCNC: 137 MMOL/L (ref 136–145)
TSH SERPL-ACNC: 2.69 MIU/L (ref 0.44–3.98)
WBC # BLD AUTO: 8.3 X10*3/UL (ref 4.4–11.3)

## 2024-02-29 PROCEDURE — 83880 ASSAY OF NATRIURETIC PEPTIDE: CPT | Performed by: NURSE PRACTITIONER

## 2024-02-29 PROCEDURE — 83735 ASSAY OF MAGNESIUM: CPT | Performed by: NURSE PRACTITIONER

## 2024-02-29 PROCEDURE — 80048 BASIC METABOLIC PNL TOTAL CA: CPT | Performed by: NURSE PRACTITIONER

## 2024-02-29 PROCEDURE — 36415 COLL VENOUS BLD VENIPUNCTURE: CPT | Performed by: NURSE PRACTITIONER

## 2024-02-29 PROCEDURE — 84484 ASSAY OF TROPONIN QUANT: CPT | Performed by: NURSE PRACTITIONER

## 2024-02-29 PROCEDURE — 99283 EMERGENCY DEPT VISIT LOW MDM: CPT | Mod: 25

## 2024-02-29 PROCEDURE — 84443 ASSAY THYROID STIM HORMONE: CPT | Performed by: NURSE PRACTITIONER

## 2024-02-29 PROCEDURE — 93005 ELECTROCARDIOGRAM TRACING: CPT

## 2024-02-29 PROCEDURE — 85025 COMPLETE CBC W/AUTO DIFF WBC: CPT | Performed by: NURSE PRACTITIONER

## 2024-02-29 PROCEDURE — 71045 X-RAY EXAM CHEST 1 VIEW: CPT | Performed by: RADIOLOGY

## 2024-02-29 PROCEDURE — 71045 X-RAY EXAM CHEST 1 VIEW: CPT

## 2024-02-29 ASSESSMENT — HEART SCORE
HISTORY: SLIGHTLY SUSPICIOUS
ECG: NORMAL
AGE: <45
RISK FACTORS: 1-2 RISK FACTORS
HEART SCORE: 1
TROPONIN: LESS THAN OR EQUAL TO NORMAL LIMIT

## 2024-02-29 ASSESSMENT — LIFESTYLE VARIABLES
HAVE PEOPLE ANNOYED YOU BY CRITICIZING YOUR DRINKING: NO
HAVE YOU EVER FELT YOU SHOULD CUT DOWN ON YOUR DRINKING: NO
EVER FELT BAD OR GUILTY ABOUT YOUR DRINKING: NO
EVER HAD A DRINK FIRST THING IN THE MORNING TO STEADY YOUR NERVES TO GET RID OF A HANGOVER: NO

## 2024-02-29 ASSESSMENT — COLUMBIA-SUICIDE SEVERITY RATING SCALE - C-SSRS
2. HAVE YOU ACTUALLY HAD ANY THOUGHTS OF KILLING YOURSELF?: NO
6. HAVE YOU EVER DONE ANYTHING, STARTED TO DO ANYTHING, OR PREPARED TO DO ANYTHING TO END YOUR LIFE?: NO
1. IN THE PAST MONTH, HAVE YOU WISHED YOU WERE DEAD OR WISHED YOU COULD GO TO SLEEP AND NOT WAKE UP?: NO

## 2024-02-29 ASSESSMENT — PAIN DESCRIPTION - PAIN TYPE: TYPE: ACUTE PAIN

## 2024-02-29 ASSESSMENT — PAIN SCALES - GENERAL
PAINLEVEL_OUTOF10: 3
PAINLEVEL_OUTOF10: 4

## 2024-02-29 ASSESSMENT — PAIN - FUNCTIONAL ASSESSMENT: PAIN_FUNCTIONAL_ASSESSMENT: 0-10

## 2024-02-29 ASSESSMENT — PAIN DESCRIPTION - DESCRIPTORS
DESCRIPTORS: ACHING;TENDER
DESCRIPTORS: ACHING;PRESSURE
DESCRIPTORS: ACHING

## 2024-02-29 ASSESSMENT — PAIN DESCRIPTION - LOCATION: LOCATION: CHEST

## 2024-02-29 ASSESSMENT — PAIN DESCRIPTION - ORIENTATION: ORIENTATION: MID

## 2024-02-29 NOTE — ED PROVIDER NOTES
HPI   Chief Complaint   Patient presents with    Chest Pain     Pt c/o midsternal chest pain and shortness of breath       44-year-old female with past medical history of depression and recent frequent palpitations presents to the emergency department today for palpitations and chest pain.  Patient states that she has been seeing her primary care provider for palpitations they ordered a Holter monitor, obstructive sleep apnea and calcium channel scoring testing.  She did have the Holter monitor performed however has not received the results back from this.  States yesterday she began having some chest discomfort while she was sitting down working.  No associated shortness of breath, nausea, vomiting or diaphoresis.  Denies dizziness or syncope.  Describes it as a pressure-like sensation.  Nonexertional nonreproducible.  It is intermittent in nature since then she is not currently having any at this time however states she has been having frequent palpitations over the past couple of hours and noted to find her blood pressure elevated at home.  Denies any lower extremity swelling.  No fever, chills, cough.                          Cory Coma Scale Score: 15                  Patient History   Past Medical History:   Diagnosis Date    Arthralgia of multiple joints 01/19/2024    Decreased range of motion of neck 01/19/2024    Dizziness 08/23/2023    Dysphagia 01/19/2024    Flushing 01/19/2024    Impacted cerumen 01/19/2024    Low back pain 01/19/2024    Low back pain, unspecified 08/01/2022    Melena 01/19/2024    Muscle spasms of neck 01/19/2024    Nausea 08/23/2023    Otalgia of both ears 01/19/2024    Palpitations 08/23/2023    Personal history of other diseases of the female genital tract 06/10/2021    History of endometriosis    Personal history of other diseases of the musculoskeletal system and connective tissue 06/10/2021    History of fibromyalgia    Personal history of peptic ulcer disease 06/10/2021     History of gastric ulcer    Strain of lumbar region 01/19/2024     Past Surgical History:   Procedure Laterality Date    OTHER SURGICAL HISTORY  06/10/2021    Hysterectomy    OTHER SURGICAL HISTORY  06/10/2021    Laparoscopy    OTHER SURGICAL HISTORY  06/10/2021    Gallbladder surgery    OTHER SURGICAL HISTORY  06/10/2021    Nose surgery     Family History   Problem Relation Name Age of Onset    Other (cva) Mother      Diabetes Mother      Thyroid disease Mother      Pancreatic cancer Father       Social History     Tobacco Use    Smoking status: Never     Passive exposure: Never    Smokeless tobacco: Never   Vaping Use    Vaping Use: Never used   Substance Use Topics    Alcohol use: Not on file    Drug use: Not on file       Physical Exam   ED Triage Vitals [02/29/24 1646]   Temperature Heart Rate Respirations BP   36.7 °C (98 °F) 72 20 123/84      Pulse Ox Temp Source Heart Rate Source Patient Position   97 % Tympanic -- Sitting      BP Location FiO2 (%)     Left arm --       Physical Exam  Vitals and nursing note reviewed.   Constitutional:       General: She is not in acute distress.     Appearance: Normal appearance. She is not toxic-appearing.   HENT:      Right Ear: Tympanic membrane normal.      Left Ear: Tympanic membrane normal.      Mouth/Throat:      Mouth: Mucous membranes are moist.      Pharynx: Oropharynx is clear.   Eyes:      Extraocular Movements: Extraocular movements intact.      Pupils: Pupils are equal, round, and reactive to light.   Cardiovascular:      Rate and Rhythm: Normal rate and regular rhythm.      Pulses: Normal pulses.      Heart sounds: Normal heart sounds.   Pulmonary:      Effort: Pulmonary effort is normal.      Breath sounds: Normal breath sounds.   Abdominal:      General: Abdomen is flat. Bowel sounds are normal.      Palpations: Abdomen is soft.      Tenderness: There is no abdominal tenderness.   Musculoskeletal:         General: Normal range of motion.      Cervical back:  Normal range of motion and neck supple.   Skin:     General: Skin is warm and dry.      Capillary Refill: Capillary refill takes less than 2 seconds.   Neurological:      General: No focal deficit present.      Mental Status: She is alert and oriented to person, place, and time.   Psychiatric:         Mood and Affect: Mood normal.         Behavior: Behavior normal.         Judgment: Judgment normal.         Labs Reviewed   CBC WITH AUTO DIFFERENTIAL - Abnormal       Result Value    WBC 8.3      nRBC 0.0      RBC 5.17      Hemoglobin 13.3      Hematocrit 41.0      MCV 79 (*)     MCH 25.7 (*)     MCHC 32.4      RDW 17.9 (*)     Platelets 342      Neutrophils % 58.5      Immature Granulocytes %, Automated 0.4      Lymphocytes % 30.2      Monocytes % 6.3      Eosinophils % 3.5      Basophils % 1.1      Neutrophils Absolute 4.86      Immature Granulocytes Absolute, Automated 0.03      Lymphocytes Absolute 2.51      Monocytes Absolute 0.52      Eosinophils Absolute 0.29      Basophils Absolute 0.09     BASIC METABOLIC PANEL - Normal    Glucose 87      Sodium 137      Potassium 4.1      Chloride 102      Bicarbonate 27      Anion Gap 12      Urea Nitrogen 17      Creatinine 0.84      eGFR 88      Calcium 9.2     MAGNESIUM - Normal    Magnesium 1.79     B-TYPE NATRIURETIC PEPTIDE - Normal    BNP 11      Narrative:        <100 pg/mL - Heart failure unlikely  100-299 pg/mL - Intermediate probability of acute heart                  failure exacerbation. Correlate with clinical                  context and patient history.    >=300 pg/mL - Heart Failure likely. Correlate with clinical                  context and patient history.    BNP testing is performed using different testing methodology at [facility] than at other [facility]. Direct result comparisons should only be made within the same method.      TSH WITH REFLEX TO FREE T4 IF ABNORMAL - Normal    Thyroid Stimulating Hormone 2.69      Narrative:      TSH testing is performed using different testing methodology at [facility] than at other [facility]. Direct result comparisons should only be made within the same method.     SERIAL TROPONIN-INITIAL - Normal    Troponin I, High Sensitivity <3      Narrative:     Less than 99th percentile of normal range cutoff-  Female and children under 18 years old <14 ng/L; Male <21 ng/L: Negative  Repeat testing should be performed if clinically indicated.     Female and children under 18 years old 14-50 ng/L; Male 21-50 ng/L:  Consistent with possible cardiac damage and possible increased clinical   risk. Serial measurements may help to assess extent of myocardial damage.     >50 ng/L: Consistent with cardiac damage, increased clinical risk and  myocardial infarction. Serial measurements may help assess extent of   myocardial damage.      NOTE: Children less than 1 year old may have higher baseline troponin   levels and results should be interpreted in conjunction with the overall   clinical context.     NOTE: Troponin I testing is performed using a different   testing methodology at [facility] than at [facility]. Direct result comparisons should only   be made within the same method.   SERIAL TROPONIN, 1 HOUR - Normal    Troponin I, High Sensitivity <3      Narrative:     Less than 99th percentile of normal range cutoff-  Female and children under 18 years old <14 ng/L; Male <21 ng/L: Negative  Repeat testing should be performed if clinically indicated.     Female and children under 18 years old 14-50 ng/L; Male 21-50 ng/L:  Consistent with possible cardiac damage and possible increased clinical   risk. Serial measurements may help to assess extent of myocardial damage.     >50 ng/L: Consistent with cardiac damage, increased clinical risk and  myocardial infarction. Serial measurements may help assess extent of   myocardial damage.      NOTE: Children less than 1 year old may  have higher baseline troponin   levels and results should be interpreted in conjunction with the overall   clinical context.     NOTE: Troponin I testing is performed using a different   testing methodology at [facility] than at other   [facility] hospitals. Direct result comparisons should only   be made within the same method.   TROPONIN SERIES- (INITIAL, 1 HR)    Narrative:     The following orders were created for panel order Troponin Series, (0, 1 HR).  Procedure                               Abnormality         Status                     ---------                               -----------         ------                     Troponin I, High Sensiti...[[id number]]  Normal              Final result               Troponin, High Sensitivi...[[id number]]  Normal              Final result                 Please view results for these tests on the individual orders.     Pain Management Panel          Latest Ref Rng & Units 8/23/2023   Pain Management Panel   Amphetamine Screen, Urine NEGATIVE PRESUMPTIVE POSITIVE    Barbiturate Screen, Urine NEGATIVE PRESUMPTIVE NEGATIVE    Fentanyl Screen, Urine NEGATIVE PRESUMPTIVE NEGATIVE      XR chest 1 view   Final Result   1.  No active cardiopulmonary process.             Signed by: Shaka Webb 2/29/2024 5:28 PM   Dictation workstation:   [id number]          ED Course [facility]   ED Course as of 02/29/24 1914   Thu Feb 29, 2024   1655 EKG shows normal sinus rhythm at a rate of 66  QRS 89 QTc 408 no ST elevation or axis deviation [RB]   1746 PERC negative [RB]      ED Course User Index  [RB] Margaret Galloway, APRN-CNP         Diagnoses as of 02/29/24 1914   Chest pain, unspecified type       Medical Decision Making  On initial evaluation patient is well-appearing the emergency department at this time.  I did review her recent primary care provider visits.  She does have a CT calcium channel scoring test upcoming as well as a sleep apnea sleep study.  Troponin x 2  are both less than 3 TSH, BMP, mag, BNP all within normal limits CBC shows leukocytosis or signs of anemia chest x-ray shows no active cardiopulmonary disease.  Patient has a heart score of 1 and PERC is negative.  Dr. Hughes discussed results in depth with the patient as well as strict return precautions close outpatient follow-up.  We did place an order for an outpatient stress echo to be performed and sent to her primary care.  They verbalized understanding agreement this plan and have no further questions or concerns and patient will be discharged home in stable condition.        Procedure  Procedures      Differential Diagnoses include ACS, electrolyte abnormality, pneumonia, cardiomegaly  This is not an exhaustive list of all the diagnosis and therapeutics are considered during the patient's evaluation for an emergency medical condition.    I discussed the differential, results and discharge plan with the patient and/or family/friend/caregiver if present.  I emphasized the importance of follow-up with the physician I referred them to in the timeframe recommended.  I explained reasons for the patient to return to the Emergency Department. Additional verbal discharge instructions were also given and discussed with the patient to supplement those generated by the EMR. We also discussed medications that were prescribed (if any) including common side effects and interactions. The patient was advised to abstain from driving, operating heavy machinery or making significant decisions while taking medications such as opiates and muscle relaxers that may impair this. All questions were addressed.  They understand return precautions and discharge instructions. The patient and/or family/friend/caregiver expressed understanding.           Margaret Galloway, YOSEPH-CHERISE  02/29/24 1916

## 2024-02-29 NOTE — TELEPHONE ENCOUNTER
Patient called said she is having chest pain, weakness, /156. I advised her to go to ER.     Also asking about her Holter Monitor results

## 2024-02-29 NOTE — TELEPHONE ENCOUNTER
Odilia Hernandez, YOSEPH-CHERISE Robin MA  Caller: Unspecified (Today,  1:50 PM)  Yes I do agree, ER. Holter monitor results are not yet available.

## 2024-02-29 NOTE — ED TRIAGE NOTES
Pt to ER with c/o midsternal chest pain and shortness of breath x 3 hrs. Pt states she just finished wearing halter monitor on Feb 5, 2024.

## 2024-03-02 ENCOUNTER — PROCEDURE VISIT (OUTPATIENT)
Dept: SLEEP MEDICINE | Facility: CLINIC | Age: 44
End: 2024-03-02
Payer: COMMERCIAL

## 2024-03-02 DIAGNOSIS — G47.33 OSA (OBSTRUCTIVE SLEEP APNEA): ICD-10-CM

## 2024-03-02 PROCEDURE — 95811 POLYSOM 6/>YRS CPAP 4/> PARM: CPT | Performed by: GENERAL PRACTICE

## 2024-03-02 ASSESSMENT — SLEEP AND FATIGUE QUESTIONNAIRES
HOW LIKELY ARE YOU TO NOD OFF OR FALL ASLEEP WHILE LYING DOWN TO REST IN THE AFTERNOON WHEN CIRCUMSTANCES PERMIT: HIGH CHANCE OF DOZING
HOW LIKELY ARE YOU TO NOD OFF OR FALL ASLEEP WHILE SITTING AND TALKING TO SOMEONE: SLIGHT CHANCE OF DOZING
HOW LIKELY ARE YOU TO NOD OFF OR FALL ASLEEP WHILE WATCHING TV: SLIGHT CHANCE OF DOZING
HOW LIKELY ARE YOU TO NOD OFF OR FALL ASLEEP WHILE SITTING AND READING: MODERATE CHANCE OF DOZING
ESS-CHAD TOTAL SCORE: 13
SITING INACTIVE IN A PUBLIC PLACE LIKE A CLASS ROOM OR A MOVIE THEATER: SLIGHT CHANCE OF DOZING
HOW LIKELY ARE YOU TO NOD OFF OR FALL ASLEEP WHEN YOU ARE A PASSENGER IN A CAR FOR AN HOUR WITHOUT A BREAK: HIGH CHANCE OF DOZING
HOW LIKELY ARE YOU TO NOD OFF OR FALL ASLEEP IN A CAR, WHILE STOPPED FOR A FEW MINUTES IN TRAFFIC: SLIGHT CHANCE OF DOZING
HOW LIKELY ARE YOU TO NOD OFF OR FALL ASLEEP WHILE SITTING QUIETLY AFTER LUNCH WITHOUT ALCOHOL: SLIGHT CHANCE OF DOZING

## 2024-03-03 VITALS
DIASTOLIC BLOOD PRESSURE: 98 MMHG | SYSTOLIC BLOOD PRESSURE: 158 MMHG | WEIGHT: 265 LBS | HEIGHT: 68 IN | BODY MASS INDEX: 40.16 KG/M2

## 2024-03-03 NOTE — PROGRESS NOTES
[facility] TECH NOTE:     Patient: Marquis Skelton   MRN//AGE: [id number]  1980  44 y.o.   Technologist: Adam Arevalo RRT   Room: [facility]   Service Date: 3/3/2024        Sleep Testing Location: [facility]    [place]: 13    TECHNOLOGIST SLEEP STUDY PROCEDURE NOTE:   This sleep study is being conducted according to the policies and procedures outlined by the AAS accreditation standards.  The sleep study procedure and processes involved during this appointment was explained to the patient/patient’s family, questions were answered. The patient/family verbalized understanding.      The patient is a 44 y.o. year old female scheduled for aDiagnostic PSG Split night with montage of:  Standard sleep montage . she arrived for her appointment.      The study that was ultimately completed was aDiagnostic PSG Split night with montage of:  Standard sleep montage .    The full study Was completed.  Patient questionnaires completed?: yes     Consents signed? yes    Initial Fall Risk Screening:     Marquis has not fallen in the last 6 months. her did not result in injury. Marquis does not have a fear of falling. He does not need assistance with sitting, standing, or walking. she does not need assistance walking in her home. she does not need assistance in an unfamiliar setting. The patient is notusing an assistive device.     Brief Study observations: None     Deviation to order/protocol and reason: Patient did not tolerate a CPAP of 4 and was increased to a CPAP of 5 for comfort.       If PAP, which was preferred mask/pressure/mode: ResMed Airfit P10 mask system size medium/ CPAP of 12.      Other:None    After the procedure, the patient/family was informed to ensure followup with ordering clinician for testing results.      Technologist: Adam Arevalo RRT

## 2024-03-04 ENCOUNTER — HOSPITAL ENCOUNTER (OUTPATIENT)
Dept: CARDIOLOGY | Facility: HOSPITAL | Age: 44
Discharge: HOME | End: 2024-03-04
Payer: COMMERCIAL

## 2024-03-04 DIAGNOSIS — R07.9 CHEST PAIN: ICD-10-CM

## 2024-03-04 PROCEDURE — 93350 STRESS TTE ONLY: CPT | Performed by: INTERNAL MEDICINE

## 2024-03-04 PROCEDURE — 2500000004 HC RX 250 GENERAL PHARMACY W/ HCPCS (ALT 636 FOR OP/ED): Performed by: NURSE PRACTITIONER

## 2024-03-04 PROCEDURE — 93017 CV STRESS TEST TRACING ONLY: CPT

## 2024-03-04 PROCEDURE — 93018 CV STRESS TEST I&R ONLY: CPT | Performed by: INTERNAL MEDICINE

## 2024-03-04 PROCEDURE — 93016 CV STRESS TEST SUPVJ ONLY: CPT | Performed by: INTERNAL MEDICINE

## 2024-03-04 RX ADMIN — PERFLUTREN 2 ML OF DILUTION: 6.52 INJECTION, SUSPENSION INTRAVENOUS at 13:39

## 2024-03-04 NOTE — NURSING NOTE
Patient has been called and informed of normal stress test results. Patient instructed to follow up with PCP Odilia Hernandez CNP. Patient voiced understanding.

## 2024-03-12 ENCOUNTER — TELEPHONE (OUTPATIENT)
Dept: PRIMARY CARE | Facility: CLINIC | Age: 44
End: 2024-03-12
Payer: COMMERCIAL

## 2024-03-12 LAB
ATRIAL RATE: 66 BPM
P AXIS: 19 DEGREES
PR INTERVAL: 140 MS
Q ONSET: 249 MS
QRS COUNT: 11 BEATS
QRS DURATION: 89 MS
QT INTERVAL: 389 MS
QTC CALCULATION(BAZETT): 408 MS
QTC FREDERICIA: 401 MS
R AXIS: 65 DEGREES
T AXIS: 71 DEGREES
T OFFSET: 444 MS
VENTRICULAR RATE: 66 BPM

## 2024-03-12 NOTE — RESULT ENCOUNTER NOTE
Sleep study is consistent with sleep apnea and it is recommended we use CPAP to treat., please let me know if you are agreeable to this and I will place the order.

## 2024-03-12 NOTE — TELEPHONE ENCOUNTER
----- Message from YOSEPH Hunt-CNP sent at 3/12/2024 10:37 AM EDT -----  Sleep study is consistent with sleep apnea and it is recommended we use CPAP to treat., please let me know if you are agreeable to this and I will place the order.

## 2024-03-12 NOTE — TELEPHONE ENCOUNTER
----- Message from YOSEPH Hunt-CNP sent at 3/12/2024 10:28 AM EDT -----  I have reviewed the results of your holter monitor, no abnormal heart rhythms but I would like you to follow up with cardiology for the palpitations.

## 2024-03-12 NOTE — RESULT ENCOUNTER NOTE
I have reviewed the results of your holter monitor, no abnormal heart rhythms but I would like you to follow up with cardiology for the palpitations.

## 2024-03-25 DIAGNOSIS — K21.9 GASTROESOPHAGEAL REFLUX DISEASE, UNSPECIFIED WHETHER ESOPHAGITIS PRESENT: ICD-10-CM

## 2024-03-25 RX ORDER — OMEPRAZOLE 20 MG/1
20 CAPSULE, DELAYED RELEASE ORAL
Qty: 90 CAPSULE | Refills: 0 | Status: SHIPPED | OUTPATIENT
Start: 2024-03-25 | End: 2024-06-23

## 2024-03-25 NOTE — TELEPHONE ENCOUNTER
----- Message from Marquis Skelton sent at 3/25/2024  8:45 AM EDT -----  Regarding: Pantoprazole Refill  Contact: [phone]  Andrew, can you please send in a refill for the Pantoprazole? I’m out of them and definitely need them! My pharmacy is the [facility] at [address] [place] [address] in [place]. Thank you!!

## 2024-03-26 DIAGNOSIS — K21.9 GASTROESOPHAGEAL REFLUX DISEASE WITHOUT ESOPHAGITIS: ICD-10-CM

## 2024-03-26 RX ORDER — PANTOPRAZOLE SODIUM 40 MG/1
40 TABLET, DELAYED RELEASE ORAL
Qty: 90 TABLET | Refills: 0 | Status: SHIPPED | OUTPATIENT
Start: 2024-03-26 | End: 2024-06-24

## 2024-03-26 NOTE — TELEPHONE ENCOUNTER
----- Message from Margarita Burris CMA sent at 3/26/2024  7:33 AM EDT -----  Regarding: FW: Pantoprazole Refill  Contact: [phone]    ----- Message -----  From: Marquis Skelton  Sent: 3/25/2024   7:00 PM EDT  To: [facility] [id number] [id number] Clinical Support Staff  Subject: Pantoprazole Refill                              Hi, I received a prescription for a different medication. Is the doctor still looking into pantoprazole 40 mg EC tablet? The one that was sent into the pharmacy is close but I don’t think it works the same. Thank you for looking into this.

## 2024-03-26 NOTE — TELEPHONE ENCOUNTER
Please fill this as this is what she asked for yesterday, I accidentally sent you omeprazole.  Thank you!

## 2024-03-26 NOTE — TELEPHONE ENCOUNTER
Told her you are out until next Tuesday and will be able to order the cpap then.  I dont recall her ever saying she was agreeable, but then again I dont do all of your messages.

## 2024-03-26 NOTE — TELEPHONE ENCOUNTER
----- Message from Marquis Skelton sent at 3/26/2024 12:47 PM EDT -----  Regarding: CPAP Machine   Contact: [phone]  Hey again, since the sleep study results are done and I okay’s getting the COAP machine, do you know when it’ll be here? Or what are the next steps? Thanks!!

## 2024-04-19 ENCOUNTER — OFFICE VISIT (OUTPATIENT)
Dept: PRIMARY CARE | Facility: CLINIC | Age: 44
End: 2024-04-19
Payer: COMMERCIAL

## 2024-04-19 VITALS
SYSTOLIC BLOOD PRESSURE: 126 MMHG | BODY MASS INDEX: 40.5 KG/M2 | DIASTOLIC BLOOD PRESSURE: 85 MMHG | HEIGHT: 68 IN | WEIGHT: 267.2 LBS | HEART RATE: 78 BPM | OXYGEN SATURATION: 97 %

## 2024-04-19 DIAGNOSIS — R00.2 PALPITATIONS: ICD-10-CM

## 2024-04-19 DIAGNOSIS — F32.A DEPRESSIVE DISORDER: ICD-10-CM

## 2024-04-19 DIAGNOSIS — E78.01 FAMILIAL HYPERCHOLESTEROLEMIA: Primary | ICD-10-CM

## 2024-04-19 DIAGNOSIS — G47.33 OSA (OBSTRUCTIVE SLEEP APNEA): ICD-10-CM

## 2024-04-19 DIAGNOSIS — Z13.220 LIPID SCREENING: ICD-10-CM

## 2024-04-19 DIAGNOSIS — E66.01 MORBID OBESITY (MULTI): ICD-10-CM

## 2024-04-19 DIAGNOSIS — R40.0 DAYTIME SOMNOLENCE: ICD-10-CM

## 2024-04-19 DIAGNOSIS — M32.9 LUPUS (MULTI): ICD-10-CM

## 2024-04-19 PROBLEM — R06.83 HABITUAL SNORING: Status: RESOLVED | Noted: 2024-04-19 | Resolved: 2024-04-19

## 2024-04-19 PROBLEM — R07.9 CHEST PAIN: Status: RESOLVED | Noted: 2024-04-19 | Resolved: 2024-04-19

## 2024-04-19 PROCEDURE — 99214 OFFICE O/P EST MOD 30 MIN: CPT | Performed by: FAMILY MEDICINE

## 2024-04-19 PROCEDURE — 1036F TOBACCO NON-USER: CPT | Performed by: FAMILY MEDICINE

## 2024-04-19 RX ORDER — DEXTROAMPHETAMINE SACCHARATE, AMPHETAMINE ASPARTATE MONOHYDRATE, DEXTROAMPHETAMINE SULFATE AND AMPHETAMINE SULFATE 5; 5; 5; 5 MG/1; MG/1; MG/1; MG/1
20 CAPSULE, EXTENDED RELEASE ORAL DAILY
COMMUNITY
Start: 2024-03-25

## 2024-04-19 RX ORDER — DEXTROAMPHETAMINE SACCHARATE, AMPHETAMINE ASPARTATE, DEXTROAMPHETAMINE SULFATE AND AMPHETAMINE SULFATE 2.5; 2.5; 2.5; 2.5 MG/1; MG/1; MG/1; MG/1
10 TABLET ORAL CONTINUOUS PRN
COMMUNITY
Start: 2024-04-04

## 2024-04-19 RX ORDER — ROSUVASTATIN CALCIUM 10 MG/1
10 TABLET, COATED ORAL DAILY
Qty: 100 TABLET | Refills: 3 | Status: SHIPPED | OUTPATIENT
Start: 2024-04-19 | End: 2025-05-24

## 2024-04-19 ASSESSMENT — PATIENT HEALTH QUESTIONNAIRE - PHQ9
1. LITTLE INTEREST OR PLEASURE IN DOING THINGS: SEVERAL DAYS
10. IF YOU CHECKED OFF ANY PROBLEMS, HOW DIFFICULT HAVE THESE PROBLEMS MADE IT FOR YOU TO DO YOUR WORK, TAKE CARE OF THINGS AT HOME, OR GET ALONG WITH OTHER PEOPLE: NOT DIFFICULT AT ALL
3. TROUBLE FALLING OR STAYING ASLEEP OR SLEEPING TOO MUCH: MORE THAN HALF THE DAYS
6. FEELING BAD ABOUT YOURSELF - OR THAT YOU ARE A FAILURE OR HAVE LET YOURSELF OR YOUR FAMILY DOWN: SEVERAL DAYS
7. TROUBLE CONCENTRATING ON THINGS, SUCH AS READING THE NEWSPAPER OR WATCHING TELEVISION: NOT AT ALL
5. POOR APPETITE OR OVEREATING: SEVERAL DAYS
SUM OF ALL RESPONSES TO PHQ9 QUESTIONS 1 AND 2: 3
4. FEELING TIRED OR HAVING LITTLE ENERGY: MORE THAN HALF THE DAYS
8. MOVING OR SPEAKING SO SLOWLY THAT OTHER PEOPLE COULD HAVE NOTICED. OR THE OPPOSITE, BEING SO FIGETY OR RESTLESS THAT YOU HAVE BEEN MOVING AROUND A LOT MORE THAN USUAL: SEVERAL DAYS
2. FEELING DOWN, DEPRESSED OR HOPELESS: MORE THAN HALF THE DAYS

## 2024-04-19 NOTE — PROGRESS NOTES
"Subjective   Patient ID: Marquis Skelton is a 44 y.o. female who presents for Follow-up (3 month ) and Cerumen Impaction (With itchy ness/).    45 y/o female presents for follow up    Was last seen to establish care with c/o palpitations. Blood work has been completed. Elevated cholesterol-lifestyle modifications discussed. Does have CT cardiac scoring test scheduled. Holter monitor was normal. Stress echo completed and was normal.     Continues to follow with psychiatry for her mental health which patient reports has been beneficial.     Sleep study completed and confirms sleep apnea, will send PAP order per recommendations from sleep study         Review of Systems   All other systems reviewed and are negative.      Objective   /85   Pulse 78   Ht 1.727 m (5' 8\")   Wt 121 kg (267 lb 3.2 oz)   SpO2 97%   BMI 40.63 kg/m²     Physical Exam  Constitutional:       Appearance: Normal appearance.   HENT:      Head: Normocephalic and atraumatic.   Cardiovascular:      Rate and Rhythm: Normal rate and regular rhythm.      Heart sounds: No murmur heard.     No gallop.   Pulmonary:      Effort: Pulmonary effort is normal. No respiratory distress.      Breath sounds: Normal breath sounds.   Musculoskeletal:         General: Normal range of motion.   Skin:     General: Skin is warm and dry.      Findings: No lesion or rash.   Neurological:      General: No focal deficit present.      Mental Status: She is alert and oriented to person, place, and time. Mental status is at baseline.   Psychiatric:         Mood and Affect: Mood normal. Affect is tearful.         Behavior: Behavior normal.         Assessment/Plan   Problem List Items Addressed This Visit             ICD-10-CM    Depressive disorder F32.A    Relevant Orders    Follow Up In [facility] - PCP - Established    Lupus (Multi) M32.9    Morbid obesity (Multi) E66.01    Relevant Orders    Follow Up In [facility] - PCP - Established    " RESOLVED: Daytime somnolence R40.0     Other Visit Diagnoses         Codes    Familial hypercholesterolemia    -  Primary E78.01    Relevant Medications    rosuvastatin (Crestor) 10 mg tablet    Other Relevant Orders    Lipid Panel    Follow Up In [facility] - PCP - Established    Palpitations     R00.2    Lipid screening     Z13.220    RHIANNON (obstructive sleep apnea)     G47.33    Relevant Orders    Positive Airway Pressure (PAP) Therapy

## 2024-05-14 ENCOUNTER — HOSPITAL ENCOUNTER (OUTPATIENT)
Dept: RADIOLOGY | Facility: HOSPITAL | Age: 44
Discharge: HOME | End: 2024-05-14
Payer: COMMERCIAL

## 2024-05-14 DIAGNOSIS — Z13.6 SCREENING FOR CARDIOVASCULAR CONDITION: ICD-10-CM

## 2024-05-14 PROCEDURE — 75571 CT HRT W/O DYE W/CA TEST: CPT

## 2024-07-25 DIAGNOSIS — E78.01 FAMILIAL HYPERCHOLESTEROLEMIA: ICD-10-CM

## 2024-07-25 RX ORDER — ROSUVASTATIN CALCIUM 10 MG/1
10 TABLET, COATED ORAL DAILY
Qty: 100 TABLET | Refills: 3 | Status: SHIPPED | OUTPATIENT
Start: 2024-07-25 | End: 2025-08-29

## 2024-09-18 ENCOUNTER — TELEPHONE (OUTPATIENT)
Dept: PRIMARY CARE | Facility: CLINIC | Age: 44
End: 2024-09-18
Payer: COMMERCIAL

## 2024-09-18 NOTE — TELEPHONE ENCOUNTER
Pt called to check on her CPAP order and [facility] canceled the order and so I asked Haydee if we have any information on it.  We got no notification about it.  She is calling her insurance to see who her DME company is and she will call and let me know.

## 2024-10-14 ENCOUNTER — APPOINTMENT (OUTPATIENT)
Dept: PRIMARY CARE | Facility: CLINIC | Age: 44
End: 2024-10-14
Payer: COMMERCIAL

## 2024-10-14 VITALS
BODY MASS INDEX: 40.92 KG/M2 | HEART RATE: 86 BPM | OXYGEN SATURATION: 98 % | WEIGHT: 270 LBS | HEIGHT: 68 IN | DIASTOLIC BLOOD PRESSURE: 78 MMHG | SYSTOLIC BLOOD PRESSURE: 114 MMHG

## 2024-10-14 DIAGNOSIS — F32.A DEPRESSIVE DISORDER: ICD-10-CM

## 2024-10-14 DIAGNOSIS — E78.01 FAMILIAL HYPERCHOLESTEROLEMIA: ICD-10-CM

## 2024-10-14 DIAGNOSIS — M79.7 FIBROMYALGIA: Primary | ICD-10-CM

## 2024-10-14 DIAGNOSIS — Z00.00 HEALTH CARE MAINTENANCE: ICD-10-CM

## 2024-10-14 DIAGNOSIS — R76.8 POSITIVE ANTINUCLEAR ANTIBODY: ICD-10-CM

## 2024-10-14 DIAGNOSIS — G47.33 OBSTRUCTIVE SLEEP APNEA SYNDROME: ICD-10-CM

## 2024-10-14 DIAGNOSIS — E66.01 MORBID OBESITY (MULTI): ICD-10-CM

## 2024-10-14 PROCEDURE — 99214 OFFICE O/P EST MOD 30 MIN: CPT

## 2024-10-14 PROCEDURE — 1036F TOBACCO NON-USER: CPT

## 2024-10-14 PROCEDURE — 3008F BODY MASS INDEX DOCD: CPT

## 2024-10-14 RX ORDER — LISDEXAMFETAMINE DIMESYLATE 40 MG/1
1 CAPSULE ORAL
COMMUNITY
Start: 2024-01-22 | End: 2024-10-14 | Stop reason: WASHOUT

## 2024-10-14 ASSESSMENT — ENCOUNTER SYMPTOMS
ALLERGIC/IMMUNOLOGIC NEGATIVE: 1
NEUROLOGICAL NEGATIVE: 1
DEPRESSION: 0
NERVOUS/ANXIOUS: 1
ENDOCRINE NEGATIVE: 1
RESPIRATORY NEGATIVE: 1
EYES NEGATIVE: 1
GASTROINTESTINAL NEGATIVE: 1
DYSPHORIC MOOD: 1
OCCASIONAL FEELINGS OF UNSTEADINESS: 0
LOSS OF SENSATION IN FEET: 0
HEMATOLOGIC/LYMPHATIC NEGATIVE: 1
CARDIOVASCULAR NEGATIVE: 1
MUSCULOSKELETAL NEGATIVE: 1
DECREASED CONCENTRATION: 1
CONSTITUTIONAL NEGATIVE: 1

## 2024-10-14 ASSESSMENT — PATIENT HEALTH QUESTIONNAIRE - PHQ9
6. FEELING BAD ABOUT YOURSELF - OR THAT YOU ARE A FAILURE OR HAVE LET YOURSELF OR YOUR FAMILY DOWN: NOT AT ALL
3. TROUBLE FALLING OR STAYING ASLEEP OR SLEEPING TOO MUCH: SEVERAL DAYS
4. FEELING TIRED OR HAVING LITTLE ENERGY: SEVERAL DAYS
1. LITTLE INTEREST OR PLEASURE IN DOING THINGS: MORE THAN HALF THE DAYS
9. THOUGHTS THAT YOU WOULD BE BETTER OFF DEAD, OR OF HURTING YOURSELF: NOT AT ALL
10. IF YOU CHECKED OFF ANY PROBLEMS, HOW DIFFICULT HAVE THESE PROBLEMS MADE IT FOR YOU TO DO YOUR WORK, TAKE CARE OF THINGS AT HOME, OR GET ALONG WITH OTHER PEOPLE: SOMEWHAT DIFFICULT
5. POOR APPETITE OR OVEREATING: NOT AT ALL
7. TROUBLE CONCENTRATING ON THINGS, SUCH AS READING THE NEWSPAPER OR WATCHING TELEVISION: SEVERAL DAYS
8. MOVING OR SPEAKING SO SLOWLY THAT OTHER PEOPLE COULD HAVE NOTICED. OR THE OPPOSITE, BEING SO FIGETY OR RESTLESS THAT YOU HAVE BEEN MOVING AROUND A LOT MORE THAN USUAL: NEARLY EVERY DAY
SUM OF ALL RESPONSES TO PHQ9 QUESTIONS 1 AND 2: 4
SUM OF ALL RESPONSES TO PHQ QUESTIONS 1-9: 10
2. FEELING DOWN, DEPRESSED OR HOPELESS: MORE THAN HALF THE DAYS

## 2024-10-14 NOTE — PROGRESS NOTES
"Subjective   Patient ID: Marquis Skelton is a 44 y.o. female who presents for Follow-up (Follow up, had 4 bad days in a row mentally recently, wanted to discuss. Left ear issues. Would like blood work, including lupus markers.).    Past Medical, Surgical, and Family History reviewed and updated in chart.     Reviewed all medications by prescribing practitioner or clinical pharmacist (such as prescriptions, OTCs, herbal therapies and supplements) and documented in the medical record.    HPI   44 yof in office with concerns for increase anxiety.    Had 4 days for feeling like something is wrong, feeling of no self worth. Endorses took all belongs and had on the table when  got home to give away because she felt she did not deserve them. Feels she was having a nervous breakdown. Follows with [facility] for mental health care.  Spoke with counselor.and he does not believe it was a nervous break down. Seen psychiatrist and they increased Duloxetine to 120mg.  Does not feel like herself she states, would like lab work checked she is afraid something with her health is wrong.     Review of Systems   Constitutional: Negative.    HENT: Negative.     Eyes: Negative.    Respiratory: Negative.     Cardiovascular: Negative.    Gastrointestinal: Negative.    Endocrine: Negative.    Genitourinary: Negative.    Musculoskeletal: Negative.    Skin: Negative.    Allergic/Immunologic: Negative.    Neurological: Negative.    Hematological: Negative.    Psychiatric/Behavioral:  Positive for decreased concentration and dysphoric mood. The patient is nervous/anxious.    All other systems reviewed and are negative.      Objective   /78   Pulse 86   Ht 1.727 m (5' 7.99\")   Wt 122 kg (270 lb)   SpO2 98%   BMI 41.06 kg/m²     Physical Exam  Constitutional:       Appearance: Normal appearance.   HENT:      Head: Normocephalic and atraumatic.      Nose: Nose normal.      Mouth/Throat:      Mouth: Mucous membranes are " moist.      Pharynx: Oropharynx is clear.   Cardiovascular:      Rate and Rhythm: Normal rate and regular rhythm.      Pulses: Normal pulses.      Heart sounds: Normal heart sounds.   Pulmonary:      Effort: Pulmonary effort is normal.      Breath sounds: Normal breath sounds.   Musculoskeletal:         General: Normal range of motion.      Cervical back: Normal range of motion.   Skin:     General: Skin is warm and dry.   Neurological:      General: No focal deficit present.      Mental Status: She is alert and oriented to person, place, and time.   Psychiatric:         Behavior: Behavior normal.         Thought Content: Thought content normal.         Judgment: Judgment normal.         Assessment/Plan   Problem List Items Addressed This Visit       Depressive disorder    Fibromyalgia - Primary    Positive antinuclear antibody    Relevant Orders    Rheumatoid Factor    Sedimentation Rate    C-Reactive Protein    Uric Acid    AFIA with Reflex to TYSON    Morbid obesity (Multi)    Obstructive sleep apnea syndrome     Using CPAP nightly. Has no concerns, believes has helped.           Other Visit Diagnoses       Familial hypercholesterolemia        Health care maintenance        Relevant Orders    CBC and Auto Differential    Basic Metabolic Panel    Lipid Panel    Hemoglobin A1C    Vitamin D 25-Hydroxy,Total (for eval of Vitamin D levels)    Vitamin B12    TSH with reflex to Free T4 if abnormal    Follow Up In [facility] - PCP - Health Maintenance             Lab work ordered, I want you to see how the increase in medication helps after a few weeks.  If is does not call for follow up, and makes sure to make follows up with life [facility].

## 2024-10-14 NOTE — PATIENT INSTRUCTIONS
I am ordering labs for you to get when you are fasting (meaning nothing to eat or drink for at least 12 hours before, water and black coffee are okay). Once we get the results, someone from the office will call you. If you do not hear from someone within one week of having your blood drawn, please call us [phone] so that we can go over the results.    It is recommend that you consume a balanced diet to include: fruits, a variety of vegetables (dark green, red and orange, legumes like beans and peas, starch, other), grains (at least half of which are whole grains), fat-free or low-fat dairy including milk,cheese, or fortified soy beverages, and proteins such as lean means, poultry, fish, eggs, nuts, seeds.   Limit processed foods, saturated and trans fats, added sugars and sodium (salt).     It is recommended that you get at least 150min exercise every week. More is even better. Moderate activities are activities that get your heart beating faster but you are still able to carry on a conversation. Vigorous activities will have you take breaths after a few words. You should also include two days of muscle strengthening activities to include all major muscle groups (arms, shoulders, chest, abdomen, back, hips and legs)    Benefits of keeping active include:  Lowering your risk of developing type II diabetes and some cancers  Control your blood pressure  Maintain a healthy weight  Improving mood and managing stress  Improving sleep

## 2024-10-18 ENCOUNTER — APPOINTMENT (OUTPATIENT)
Dept: PRIMARY CARE | Facility: CLINIC | Age: 44
End: 2024-10-18
Payer: COMMERCIAL

## 2024-11-13 DIAGNOSIS — K21.9 GASTROESOPHAGEAL REFLUX DISEASE, UNSPECIFIED WHETHER ESOPHAGITIS PRESENT: ICD-10-CM

## 2024-11-13 DIAGNOSIS — E78.01 FAMILIAL HYPERCHOLESTEROLEMIA: ICD-10-CM

## 2024-11-13 RX ORDER — PANTOPRAZOLE SODIUM 40 MG/1
40 TABLET, DELAYED RELEASE ORAL
Qty: 90 TABLET | Refills: 3 | Status: ON HOLD | OUTPATIENT
Start: 2024-11-13 | End: 2025-11-13

## 2024-11-13 RX ORDER — ROSUVASTATIN CALCIUM 10 MG/1
10 TABLET, COATED ORAL DAILY
Qty: 90 TABLET | Refills: 3 | Status: ON HOLD | OUTPATIENT
Start: 2024-11-13 | End: 2025-11-08

## 2024-11-13 NOTE — TELEPHONE ENCOUNTER
Marquis Skelton [id number] [place] [id number] Clinical Support Staff  Phone Number: [phone]     Good morning, I’ve added the [facility] to my list of pharmacies. Can you please submit a refill for Pantoprazole 40mg (ran out) and Rosuvastatin 10mg (due soon).    Thank you!

## 2024-11-15 ENCOUNTER — APPOINTMENT (OUTPATIENT)
Dept: CARDIOLOGY | Facility: HOSPITAL | Age: 44
End: 2024-11-15
Payer: COMMERCIAL

## 2024-11-15 ENCOUNTER — HOSPITAL ENCOUNTER (EMERGENCY)
Facility: HOSPITAL | Age: 44
Discharge: OTHER INSTITUTION | End: 2024-11-17
Attending: EMERGENCY MEDICINE
Payer: COMMERCIAL

## 2024-11-15 DIAGNOSIS — T14.91XA SUICIDE ATTEMPT (MULTI): Primary | ICD-10-CM

## 2024-11-15 LAB
ALBUMIN SERPL BCP-MCNC: 3.9 G/DL (ref 3.4–5)
ALP SERPL-CCNC: 94 U/L (ref 33–110)
ALT SERPL W P-5'-P-CCNC: 27 U/L (ref 7–45)
ANION GAP SERPL CALC-SCNC: 12 MMOL/L (ref 10–20)
APAP SERPL-MCNC: <10 UG/ML
AST SERPL W P-5'-P-CCNC: 23 U/L (ref 9–39)
BASOPHILS # BLD AUTO: 0.07 X10*3/UL (ref 0–0.1)
BASOPHILS NFR BLD AUTO: 0.8 %
BILIRUB SERPL-MCNC: 0.2 MG/DL (ref 0–1.2)
BUN SERPL-MCNC: 13 MG/DL (ref 6–23)
CALCIUM SERPL-MCNC: 9.1 MG/DL (ref 8.6–10.3)
CHLORIDE SERPL-SCNC: 108 MMOL/L (ref 98–107)
CO2 SERPL-SCNC: 23 MMOL/L (ref 21–32)
CREAT SERPL-MCNC: 0.76 MG/DL (ref 0.5–1.05)
EGFRCR SERPLBLD CKD-EPI 2021: >90 ML/MIN/1.73M*2
EOSINOPHIL # BLD AUTO: 0.31 X10*3/UL (ref 0–0.7)
EOSINOPHIL NFR BLD AUTO: 3.5 %
ERYTHROCYTE [DISTWIDTH] IN BLOOD BY AUTOMATED COUNT: 17.4 % (ref 11.5–14.5)
ETHANOL SERPL-MCNC: 67 MG/DL
GLUCOSE SERPL-MCNC: 94 MG/DL (ref 74–99)
HCT VFR BLD AUTO: 37.6 % (ref 36–46)
HGB BLD-MCNC: 12.2 G/DL (ref 12–16)
IMM GRANULOCYTES # BLD AUTO: 0.02 X10*3/UL (ref 0–0.7)
IMM GRANULOCYTES NFR BLD AUTO: 0.2 % (ref 0–0.9)
LYMPHOCYTES # BLD AUTO: 2.31 X10*3/UL (ref 1.2–4.8)
LYMPHOCYTES NFR BLD AUTO: 25.8 %
MCH RBC QN AUTO: 26.2 PG (ref 26–34)
MCHC RBC AUTO-ENTMCNC: 32.4 G/DL (ref 32–36)
MCV RBC AUTO: 81 FL (ref 80–100)
MONOCYTES # BLD AUTO: 0.61 X10*3/UL (ref 0.1–1)
MONOCYTES NFR BLD AUTO: 6.8 %
NEUTROPHILS # BLD AUTO: 5.64 X10*3/UL (ref 1.2–7.7)
NEUTROPHILS NFR BLD AUTO: 62.9 %
NRBC BLD-RTO: 0 /100 WBCS (ref 0–0)
PLATELET # BLD AUTO: 322 X10*3/UL (ref 150–450)
POTASSIUM SERPL-SCNC: 4 MMOL/L (ref 3.5–5.3)
PROT SERPL-MCNC: 6.8 G/DL (ref 6.4–8.2)
RBC # BLD AUTO: 4.66 X10*6/UL (ref 4–5.2)
SALICYLATES SERPL-MCNC: <3 MG/DL
SODIUM SERPL-SCNC: 139 MMOL/L (ref 136–145)
WBC # BLD AUTO: 9 X10*3/UL (ref 4.4–11.3)

## 2024-11-15 PROCEDURE — 84075 ASSAY ALKALINE PHOSPHATASE: CPT | Performed by: EMERGENCY MEDICINE

## 2024-11-15 PROCEDURE — 2500000004 HC RX 250 GENERAL PHARMACY W/ HCPCS (ALT 636 FOR OP/ED): Performed by: EMERGENCY MEDICINE

## 2024-11-15 PROCEDURE — 90471 IMMUNIZATION ADMIN: CPT | Performed by: EMERGENCY MEDICINE

## 2024-11-15 PROCEDURE — 90715 TDAP VACCINE 7 YRS/> IM: CPT | Performed by: EMERGENCY MEDICINE

## 2024-11-15 PROCEDURE — 93005 ELECTROCARDIOGRAM TRACING: CPT

## 2024-11-15 PROCEDURE — 80143 DRUG ASSAY ACETAMINOPHEN: CPT | Performed by: EMERGENCY MEDICINE

## 2024-11-15 PROCEDURE — 36415 COLL VENOUS BLD VENIPUNCTURE: CPT | Performed by: EMERGENCY MEDICINE

## 2024-11-15 PROCEDURE — 85025 COMPLETE CBC W/AUTO DIFF WBC: CPT | Performed by: EMERGENCY MEDICINE

## 2024-11-15 PROCEDURE — 99285 EMERGENCY DEPT VISIT HI MDM: CPT | Mod: 25

## 2024-11-15 SDOH — SOCIAL STABILITY: SOCIAL NETWORK: VISITOR BEHAVIORS: UNABLE TO ASSESS

## 2024-11-15 SDOH — HEALTH STABILITY: MENTAL HEALTH: HAVE YOU ACTUALLY HAD ANY THOUGHTS OF KILLING YOURSELF?: YES

## 2024-11-15 SDOH — HEALTH STABILITY: MENTAL HEALTH
HAVE YOU STARTED TO WORK OUT OR WORKED OUT THE DETAILS OF HOW TO KILL YOURSELF? DO YOU INTENT TO CARRY OUT THIS PLAN?: NO

## 2024-11-15 SDOH — HEALTH STABILITY: MENTAL HEALTH: WAS THIS WITHIN THE PAST THREE MONTHS?: YES

## 2024-11-15 SDOH — SOCIAL STABILITY: SOCIAL INSECURITY: FAMILY BEHAVIORS: UNABLE TO ASSESS

## 2024-11-15 SDOH — HEALTH STABILITY: MENTAL HEALTH: CONTENT: BLAMING SELF

## 2024-11-15 SDOH — HEALTH STABILITY: MENTAL HEALTH: FOR HIGH RISK PATIENTS: ALL INTERVENTIONS ABOVE, PLUS:;1:1 PATIENT OBSERVER AT ALL TIMES

## 2024-11-15 SDOH — HEALTH STABILITY: MENTAL HEALTH: BEHAVIORAL HEALTH(WDL): EXCEPTIONS TO WDL

## 2024-11-15 SDOH — HEALTH STABILITY: MENTAL HEALTH: HAVE YOU EVER DONE ANYTHING, STARTED TO DO ANYTHING, OR PREPARED TO DO ANYTHING TO END YOUR LIFE?: YES

## 2024-11-15 SDOH — HEALTH STABILITY: MENTAL HEALTH: SUICIDE ASSESSMENT: ADULT (C-SSRS)

## 2024-11-15 SDOH — HEALTH STABILITY: MENTAL HEALTH: BEHAVIORS/MOOD: FLAT AFFECT;COOPERATIVE

## 2024-11-15 SDOH — HEALTH STABILITY: MENTAL HEALTH

## 2024-11-15 SDOH — HEALTH STABILITY: MENTAL HEALTH: HAVE YOU HAD THESE THOUGHTS AND HAD SOME INTENTION OF ACTING ON THEM?: NO

## 2024-11-15 SDOH — HEALTH STABILITY: MENTAL HEALTH: SLEEP PATTERN: UNABLE TO ASSESS

## 2024-11-15 SDOH — HEALTH STABILITY: MENTAL HEALTH: NEEDS EXPRESSED: DENIES

## 2024-11-15 SDOH — HEALTH STABILITY: MENTAL HEALTH: HAVE YOU WISHED YOU WERE DEAD OR WISHED YOU COULD GO TO SLEEP AND NOT WAKE UP?: YES

## 2024-11-15 SDOH — HEALTH STABILITY: MENTAL HEALTH: HAVE YOU BEEN THINKING ABOUT HOW YOU MIGHT DO THIS?: YES

## 2024-11-15 SDOH — HEALTH STABILITY: MENTAL HEALTH: DELUSIONS: OTHER (COMMENT)

## 2024-11-15 SDOH — HEALTH STABILITY: MENTAL HEALTH
OTHER SUICIDE PRECAUTIONS INCLUDE: PATIENT PLACED IN AN EASILY OBSERVABLE ROOM WITH DOOR/CURTAIN REMAINING OPEN;PATIENT PLACED IN GOWN (SNAPS OR PAPER GOWNS PREFERRED) AND WANDED;REMAINING RISKS IDENTIFIED AND MITIGATED;PROVIDER NOTIFIED;HOURLY BEHAVIORAL ASSESSMENT PERFORMED;PERSONAL BEL

## 2024-11-15 ASSESSMENT — COLUMBIA-SUICIDE SEVERITY RATING SCALE - C-SSRS
6. HAVE YOU EVER DONE ANYTHING, STARTED TO DO ANYTHING, OR PREPARED TO DO ANYTHING TO END YOUR LIFE?: YES
4. HAVE YOU HAD THESE THOUGHTS AND HAD SOME INTENTION OF ACTING ON THEM?: YES
5. HAVE YOU STARTED TO WORK OUT OR WORKED OUT THE DETAILS OF HOW TO KILL YOURSELF? DO YOU INTEND TO CARRY OUT THIS PLAN?: NO
6. HAVE YOU EVER DONE ANYTHING, STARTED TO DO ANYTHING, OR PREPARED TO DO ANYTHING TO END YOUR LIFE?: YES
2. HAVE YOU ACTUALLY HAD ANY THOUGHTS OF KILLING YOURSELF?: YES
1. IN THE PAST MONTH, HAVE YOU WISHED YOU WERE DEAD OR WISHED YOU COULD GO TO SLEEP AND NOT WAKE UP?: YES

## 2024-11-15 ASSESSMENT — PAIN - FUNCTIONAL ASSESSMENT: PAIN_FUNCTIONAL_ASSESSMENT: 0-10

## 2024-11-15 ASSESSMENT — PAIN SCALES - GENERAL: PAINLEVEL_OUTOF10: 0 - NO PAIN

## 2024-11-16 PROBLEM — T14.91XA SUICIDE ATTEMPT (MULTI): Status: ACTIVE | Noted: 2024-11-16

## 2024-11-16 LAB
AMPHETAMINES UR QL SCN: ABNORMAL
APPEARANCE UR: CLEAR
BARBITURATES UR QL SCN: ABNORMAL
BENZODIAZ UR QL SCN: ABNORMAL
BILIRUB UR STRIP.AUTO-MCNC: NEGATIVE MG/DL
BZE UR QL SCN: ABNORMAL
CANNABINOIDS UR QL SCN: ABNORMAL
COLOR UR: NORMAL
FENTANYL+NORFENTANYL UR QL SCN: ABNORMAL
GLUCOSE UR STRIP.AUTO-MCNC: NORMAL MG/DL
HOLD SPECIMEN: NORMAL
KETONES UR STRIP.AUTO-MCNC: NEGATIVE MG/DL
LEUKOCYTE ESTERASE UR QL STRIP.AUTO: NEGATIVE
METHADONE UR QL SCN: ABNORMAL
NITRITE UR QL STRIP.AUTO: NEGATIVE
OPIATES UR QL SCN: ABNORMAL
OXYCODONE+OXYMORPHONE UR QL SCN: ABNORMAL
PCP UR QL SCN: ABNORMAL
PH UR STRIP.AUTO: 5 [PH]
PROT UR STRIP.AUTO-MCNC: NEGATIVE MG/DL
RBC # UR STRIP.AUTO: NEGATIVE /UL
SP GR UR STRIP.AUTO: 1.01
UROBILINOGEN UR STRIP.AUTO-MCNC: NORMAL MG/DL

## 2024-11-16 PROCEDURE — 80307 DRUG TEST PRSMV CHEM ANLYZR: CPT | Performed by: EMERGENCY MEDICINE

## 2024-11-16 PROCEDURE — 81003 URINALYSIS AUTO W/O SCOPE: CPT | Performed by: EMERGENCY MEDICINE

## 2024-11-16 SDOH — HEALTH STABILITY: MENTAL HEALTH: CONTENT: UNABLE TO ASSESS

## 2024-11-16 SDOH — HEALTH STABILITY: MENTAL HEALTH: FOR HIGH RISK PATIENTS: 1:1 PATIENT OBSERVER AT ALL TIMES

## 2024-11-16 SDOH — HEALTH STABILITY: MENTAL HEALTH: BEHAVIORS/MOOD: CALM;PLEASANT;COOPERATIVE

## 2024-11-16 SDOH — HEALTH STABILITY: MENTAL HEALTH: FOR HIGH RISK PATIENTS: ALL INTERVENTIONS ABOVE, PLUS:;1:1 PATIENT OBSERVER AT ALL TIMES

## 2024-11-16 SDOH — HEALTH STABILITY: MENTAL HEALTH: BEHAVIORAL HEALTH(WDL): EXCEPTIONS TO WDL

## 2024-11-16 SDOH — HEALTH STABILITY: MENTAL HEALTH: WAS THIS WITHIN THE PAST THREE MONTHS?: YES

## 2024-11-16 SDOH — HEALTH STABILITY: MENTAL HEALTH: BEHAVIORS/MOOD: SLEEPING

## 2024-11-16 SDOH — HEALTH STABILITY: MENTAL HEALTH: CONTENT: UNREMARKABLE

## 2024-11-16 SDOH — HEALTH STABILITY: MENTAL HEALTH: IN THE PAST WEEK, HAVE YOU BEEN HAVING THOUGHTS ABOUT KILLING YOURSELF?: YES

## 2024-11-16 SDOH — HEALTH STABILITY: MENTAL HEALTH: SLEEP PATTERN: NAPS DURING THE DAY

## 2024-11-16 SDOH — HEALTH STABILITY: MENTAL HEALTH: NON-SPECIFIC ACTIVE SUICIDAL THOUGHTS (PAST 1 MONTH): YES

## 2024-11-16 SDOH — HEALTH STABILITY: MENTAL HEALTH: NEEDS EXPRESSED: OTHER (COMMENT)

## 2024-11-16 SDOH — HEALTH STABILITY: MENTAL HEALTH: HALLUCINATION: UNABLE TO ASSESS

## 2024-11-16 SDOH — HEALTH STABILITY: MENTAL HEALTH: BEHAVIORS/MOOD: CALM;SLEEPING;COOPERATIVE;PLEASANT

## 2024-11-16 SDOH — HEALTH STABILITY: MENTAL HEALTH: ARE YOU HAVING THOUGHTS OF KILLING YOURSELF RIGHT NOW?: NO

## 2024-11-16 SDOH — HEALTH STABILITY: MENTAL HEALTH: HAVE YOU BEEN THINKING ABOUT HOW YOU MIGHT DO THIS?: NO

## 2024-11-16 SDOH — HEALTH STABILITY: MENTAL HEALTH: BEHAVIORS/MOOD: COOPERATIVE

## 2024-11-16 SDOH — HEALTH STABILITY: MENTAL HEALTH: WISH TO BE DEAD (PAST 1 MONTH): YES

## 2024-11-16 SDOH — HEALTH STABILITY: MENTAL HEALTH: ACTIVE SUICIDAL IDEATION WITH SOME INTENT TO ACT, WITHOUT SPECIFIC PLAN (PAST 1 MONTH): YES

## 2024-11-16 SDOH — HEALTH STABILITY: MENTAL HEALTH: BEHAVIORS/MOOD: CALM;PLEASANT;COOPERATIVE;SLEEPING

## 2024-11-16 SDOH — HEALTH STABILITY: MENTAL HEALTH

## 2024-11-16 SDOH — HEALTH STABILITY: MENTAL HEALTH: HAVE YOU HAD THESE THOUGHTS AND HAD SOME INTENTION OF ACTING ON THEM?: NO

## 2024-11-16 SDOH — HEALTH STABILITY: MENTAL HEALTH: BEHAVIORAL HEALTH(WDL): WITHIN DEFINED LIMITS

## 2024-11-16 SDOH — HEALTH STABILITY: MENTAL HEALTH: IN THE PAST FEW WEEKS, HAVE YOU WISHED YOU WERE DEAD?: NO

## 2024-11-16 SDOH — HEALTH STABILITY: MENTAL HEALTH: SUICIDAL BEHAVIOR (LIFETIME): YES

## 2024-11-16 SDOH — HEALTH STABILITY: MENTAL HEALTH: DELUSIONS: OTHER (COMMENT)

## 2024-11-16 SDOH — HEALTH STABILITY: MENTAL HEALTH: HAVE YOU EVER TRIED TO KILL YOURSELF?: YES

## 2024-11-16 SDOH — HEALTH STABILITY: MENTAL HEALTH: BEHAVIORS/MOOD: CALM;SLEEPING;COOPERATIVE

## 2024-11-16 SDOH — HEALTH STABILITY: MENTAL HEALTH: BEHAVIORS/MOOD: CALM;SLEEPING

## 2024-11-16 SDOH — HEALTH STABILITY: MENTAL HEALTH: HAVE YOU WISHED YOU WERE DEAD OR WISHED YOU COULD GO TO SLEEP AND NOT WAKE UP?: NO

## 2024-11-16 SDOH — HEALTH STABILITY: MENTAL HEALTH: IN THE PAST FEW WEEKS, HAVE YOU FELT THAT YOU OR YOUR FAMILY WOULD BE BETTER OFF IF YOU WERE DEAD?: NO

## 2024-11-16 SDOH — HEALTH STABILITY: MENTAL HEALTH: SUICIDAL BEHAVIOR (DESCRIPTION): OVERDOSING AND CUTTING

## 2024-11-16 SDOH — HEALTH STABILITY: MENTAL HEALTH: ANXIETY SYMPTOMS: NO PROBLEMS REPORTED OR OBSERVED.

## 2024-11-16 SDOH — HEALTH STABILITY: MENTAL HEALTH: SLEEP PATTERN: NAPS DURING THE DAY;SLEEPS ALL NIGHT

## 2024-11-16 SDOH — HEALTH STABILITY: MENTAL HEALTH
HAVE YOU STARTED TO WORK OUT OR WORKED OUT THE DETAILS OF HOW TO KILL YOURSELF? DO YOU INTENT TO CARRY OUT THIS PLAN?: YES

## 2024-11-16 SDOH — HEALTH STABILITY: MENTAL HEALTH: HOW DID YOU TRY TO KILL YOURSELF?: OVERDOSING AND CUTTING MY WRIST.

## 2024-11-16 SDOH — HEALTH STABILITY: MENTAL HEALTH: HAVE YOU ACTUALLY HAD ANY THOUGHTS OF KILLING YOURSELF?: YES

## 2024-11-16 SDOH — HEALTH STABILITY: MENTAL HEALTH: SLEEP PATTERN: SLEEPS ALL NIGHT

## 2024-11-16 SDOH — HEALTH STABILITY: MENTAL HEALTH: SUICIDAL BEHAVIOR (3 MONTHS): YES

## 2024-11-16 SDOH — HEALTH STABILITY: MENTAL HEALTH: BEHAVIORS/MOOD: CRYING;PLEASANT;COOPERATIVE;SLEEPING

## 2024-11-16 SDOH — HEALTH STABILITY: MENTAL HEALTH: SUICIDE ASSESSMENT: ADULT (C-SSRS)

## 2024-11-16 SDOH — HEALTH STABILITY: MENTAL HEALTH: WHEN DID YOU TRY TO KILL YOURSELF?: PRIOR TO COMING TO THE ED

## 2024-11-16 SDOH — HEALTH STABILITY: MENTAL HEALTH: HAVE YOU EVER DONE ANYTHING, STARTED TO DO ANYTHING, OR PREPARED TO DO ANYTHING TO END YOUR LIFE?: YES

## 2024-11-16 SDOH — HEALTH STABILITY: MENTAL HEALTH: DEPRESSION SYMPTOMS: CRYING;FEELINGS OF HELPLESSNESS;FEELINGS OF HOPELESSESS;FEELINGS OF WORTHLESSNESS

## 2024-11-16 SDOH — HEALTH STABILITY: MENTAL HEALTH: ACTIVE SUICIDAL IDEATION WITH SPECIFIC PLAN AND INTENT (PAST 1 MONTH): YES

## 2024-11-16 SDOH — HEALTH STABILITY: MENTAL HEALTH: IN THE PAST FEW WEEKS, HAVE YOU FELT THAT YOU OR YOUR FAMILY WOULD BE BETTER OFF IF YOU WERE DEAD?: YES

## 2024-11-16 SDOH — HEALTH STABILITY: MENTAL HEALTH: IN THE PAST FEW WEEKS, HAVE YOU WISHED YOU WERE DEAD?: YES

## 2024-11-16 SDOH — HEALTH STABILITY: MENTAL HEALTH: SLEEP PATTERN: SLEEPS ALL NIGHT;NAPS DURING THE DAY

## 2024-11-16 SDOH — HEALTH STABILITY: MENTAL HEALTH: RISK OF SUICIDE: HIGH RISK

## 2024-11-16 ASSESSMENT — PAIN SCALES - GENERAL: PAINLEVEL_OUTOF10: 0 - NO PAIN
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
## 2024-11-16 NOTE — ED NOTES
Poison control called at this time. Advised observation for 6-8 hours. Advised to monitor patient for tachycardia, elevated BP, and a prolonged QT interval. Peak for the medication is 2 hours.      Melita Patiño RN  11/15/24 2477
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
## 2024-11-16 NOTE — PROGRESS NOTES
Emergency Medicine Transition of Care Note.    I received Marquis Skelton in signout from Dr. Brownlee.  Please see the previous ED provider note for all HPI, PE and MDM up to the time of signout at 0700. This is in addition to the primary record.    In brief Marquis Skelton is an 44 y.o. female presenting for   Chief Complaint   Patient presents with    Suicide Attempt    Drug Overdose     At the time of signout we were awaiting: Psychiatric evaluation    ED Course as of 11/16/24 1624   Fri Nov 15, 2024   2217 EKG performed at 2215 and interpreted by me shows sinus rhythm at a rate of 82.  Intervals are normal.  The axis is normal.  There are no ST or T wave changes.  No STEMI. [SP]   2359 CBC, CMP are within normal limits.  Acetaminophen salicylates are undetectable.  Alcohol is elevated at 67. [SP]      ED Course User Index  [SP] Shalini Brownlee DO         Diagnoses as of 11/16/24 1624   Suicide attempt (Multi)       Medical Decision Making  Patient is medically cleared for psychiatric evaluation at this time.  Patient attempted suicide by multiple means.  At this time patient would benefit from hospitalization.  Patient is pending transfer to a psychiatric facility for psychiatric treatment at the time of signout to the oncoming physician.    Final diagnoses:   None           Procedure  Procedures    Amrida Goldberg MD

## 2024-11-17 ENCOUNTER — HOSPITAL ENCOUNTER (INPATIENT)
Facility: HOSPITAL | Age: 44
DRG: 885 | End: 2024-11-17
Attending: PSYCHIATRY & NEUROLOGY | Admitting: PSYCHIATRY & NEUROLOGY
Payer: COMMERCIAL

## 2024-11-17 VITALS
SYSTOLIC BLOOD PRESSURE: 142 MMHG | BODY MASS INDEX: 40.4 KG/M2 | HEART RATE: 59 BPM | OXYGEN SATURATION: 98 % | DIASTOLIC BLOOD PRESSURE: 84 MMHG | TEMPERATURE: 97 F | RESPIRATION RATE: 16 BRPM | HEIGHT: 68 IN | WEIGHT: 266.54 LBS

## 2024-11-17 VITALS
TEMPERATURE: 98.2 F | HEIGHT: 68 IN | HEART RATE: 68 BPM | WEIGHT: 275.57 LBS | DIASTOLIC BLOOD PRESSURE: 79 MMHG | BODY MASS INDEX: 41.77 KG/M2 | OXYGEN SATURATION: 96 % | SYSTOLIC BLOOD PRESSURE: 137 MMHG | RESPIRATION RATE: 16 BRPM

## 2024-11-17 DIAGNOSIS — F51.05 INSOMNIA DUE TO OTHER MENTAL DISORDER: Primary | ICD-10-CM

## 2024-11-17 DIAGNOSIS — F99 INSOMNIA DUE TO OTHER MENTAL DISORDER: Primary | ICD-10-CM

## 2024-11-17 DIAGNOSIS — Z65.8 PSYCHOSOCIAL STRESSORS: ICD-10-CM

## 2024-11-17 DIAGNOSIS — F41.8 OTHER SPECIFIED ANXIETY DISORDERS: ICD-10-CM

## 2024-11-17 DIAGNOSIS — E55.9 VITAMIN D DEFICIENCY: ICD-10-CM

## 2024-11-17 DIAGNOSIS — F41.9 ANXIETY: ICD-10-CM

## 2024-11-17 DIAGNOSIS — F33.2 SEVERE EPISODE OF RECURRENT MAJOR DEPRESSIVE DISORDER, WITHOUT PSYCHOTIC FEATURES (MULTI): ICD-10-CM

## 2024-11-17 DIAGNOSIS — I10 HYPERTENSION, UNSPECIFIED TYPE: ICD-10-CM

## 2024-11-17 LAB
CHOLEST SERPL-MCNC: 166 MG/DL (ref 0–199)
CHOLESTEROL/HDL RATIO: 2.9
GLUCOSE P FAST SERPL-MCNC: 89 MG/DL (ref 74–99)
HDLC SERPL-MCNC: 57.2 MG/DL
LDLC SERPL CALC-MCNC: 90 MG/DL
NON HDL CHOLESTEROL: 109 MG/DL (ref 0–149)
TRIGL SERPL-MCNC: 95 MG/DL (ref 0–149)
VLDL: 19 MG/DL (ref 0–40)

## 2024-11-17 PROCEDURE — 2500000004 HC RX 250 GENERAL PHARMACY W/ HCPCS (ALT 636 FOR OP/ED): Performed by: PSYCHIATRY & NEUROLOGY

## 2024-11-17 PROCEDURE — 2500000002 HC RX 250 W HCPCS SELF ADMINISTERED DRUGS (ALT 637 FOR MEDICARE OP, ALT 636 FOR OP/ED): Performed by: PSYCHIATRY & NEUROLOGY

## 2024-11-17 PROCEDURE — 2500000001 HC RX 250 WO HCPCS SELF ADMINISTERED DRUGS (ALT 637 FOR MEDICARE OP): Performed by: NURSE PRACTITIONER

## 2024-11-17 PROCEDURE — 2500000001 HC RX 250 WO HCPCS SELF ADMINISTERED DRUGS (ALT 637 FOR MEDICARE OP): Performed by: PSYCHIATRY & NEUROLOGY

## 2024-11-17 PROCEDURE — 80061 LIPID PANEL: CPT | Performed by: PSYCHIATRY & NEUROLOGY

## 2024-11-17 PROCEDURE — 90471 IMMUNIZATION ADMIN: CPT | Performed by: PSYCHIATRY & NEUROLOGY

## 2024-11-17 PROCEDURE — 90656 IIV3 VACC NO PRSV 0.5 ML IM: CPT | Performed by: PSYCHIATRY & NEUROLOGY

## 2024-11-17 PROCEDURE — 1240000001 HC SEMI-PRIVATE BH ROOM DAILY

## 2024-11-17 PROCEDURE — 2500000002 HC RX 250 W HCPCS SELF ADMINISTERED DRUGS (ALT 637 FOR MEDICARE OP, ALT 636 FOR OP/ED): Performed by: NURSE PRACTITIONER

## 2024-11-17 PROCEDURE — 99223 1ST HOSP IP/OBS HIGH 75: CPT | Performed by: PSYCHIATRY & NEUROLOGY

## 2024-11-17 PROCEDURE — 82947 ASSAY GLUCOSE BLOOD QUANT: CPT | Performed by: PSYCHIATRY & NEUROLOGY

## 2024-11-17 PROCEDURE — 99222 1ST HOSP IP/OBS MODERATE 55: CPT | Performed by: NURSE PRACTITIONER

## 2024-11-17 PROCEDURE — 36415 COLL VENOUS BLD VENIPUNCTURE: CPT | Performed by: PSYCHIATRY & NEUROLOGY

## 2024-11-17 RX ORDER — HALOPERIDOL 5 MG/ML
5 INJECTION INTRAMUSCULAR EVERY 6 HOURS PRN
Status: DISCONTINUED | OUTPATIENT
Start: 2024-11-17 | End: 2024-11-20 | Stop reason: HOSPADM

## 2024-11-17 RX ORDER — HYDROXYZINE HYDROCHLORIDE 25 MG/1
50 TABLET, FILM COATED ORAL EVERY 6 HOURS PRN
Status: DISCONTINUED | OUTPATIENT
Start: 2024-11-17 | End: 2024-11-18

## 2024-11-17 RX ORDER — LORAZEPAM 1 MG/1
2 TABLET ORAL EVERY 6 HOURS PRN
Status: DISCONTINUED | OUTPATIENT
Start: 2024-11-17 | End: 2024-11-20 | Stop reason: HOSPADM

## 2024-11-17 RX ORDER — ESTRADIOL 0.05 MG/D
1 FILM, EXTENDED RELEASE TRANSDERMAL 2 TIMES WEEKLY
Status: DISCONTINUED | OUTPATIENT
Start: 2024-11-18 | End: 2024-11-17

## 2024-11-17 RX ORDER — TRAZODONE HYDROCHLORIDE 50 MG/1
50 TABLET ORAL NIGHTLY PRN
Status: DISCONTINUED | OUTPATIENT
Start: 2024-11-17 | End: 2024-11-20 | Stop reason: HOSPADM

## 2024-11-17 RX ORDER — ACETAMINOPHEN 325 MG/1
650 TABLET ORAL EVERY 6 HOURS PRN
Status: DISCONTINUED | OUTPATIENT
Start: 2024-11-17 | End: 2024-11-20 | Stop reason: HOSPADM

## 2024-11-17 RX ORDER — HALOPERIDOL 5 MG/1
5 TABLET ORAL EVERY 6 HOURS PRN
Status: DISCONTINUED | OUTPATIENT
Start: 2024-11-17 | End: 2024-11-20 | Stop reason: HOSPADM

## 2024-11-17 RX ORDER — DULOXETIN HYDROCHLORIDE 60 MG/1
120 CAPSULE, DELAYED RELEASE ORAL DAILY
Status: DISCONTINUED | OUTPATIENT
Start: 2024-11-17 | End: 2024-11-20 | Stop reason: HOSPADM

## 2024-11-17 RX ORDER — HYDROXYZINE HYDROCHLORIDE 25 MG/1
25 TABLET, FILM COATED ORAL ONCE
COMMUNITY

## 2024-11-17 RX ORDER — CETIRIZINE HYDROCHLORIDE 10 MG/1
10 TABLET ORAL DAILY
Status: DISCONTINUED | OUTPATIENT
Start: 2024-11-17 | End: 2024-11-20 | Stop reason: HOSPADM

## 2024-11-17 RX ORDER — PROPRANOLOL HYDROCHLORIDE 60 MG/1
60 CAPSULE, EXTENDED RELEASE ORAL DAILY
Status: DISCONTINUED | OUTPATIENT
Start: 2024-11-17 | End: 2024-11-20 | Stop reason: HOSPADM

## 2024-11-17 RX ORDER — NALTREXONE HYDROCHLORIDE 50 MG/1
50 TABLET, FILM COATED ORAL DAILY
COMMUNITY

## 2024-11-17 RX ORDER — ACETAMINOPHEN 500 MG
5 TABLET ORAL NIGHTLY PRN
Status: DISCONTINUED | OUTPATIENT
Start: 2024-11-17 | End: 2024-11-20 | Stop reason: HOSPADM

## 2024-11-17 RX ORDER — DOCUSATE SODIUM 100 MG/1
100 CAPSULE, LIQUID FILLED ORAL 2 TIMES DAILY PRN
Status: DISCONTINUED | OUTPATIENT
Start: 2024-11-17 | End: 2024-11-20 | Stop reason: HOSPADM

## 2024-11-17 RX ORDER — PANTOPRAZOLE SODIUM 40 MG/1
40 TABLET, DELAYED RELEASE ORAL
Status: DISCONTINUED | OUTPATIENT
Start: 2024-11-17 | End: 2024-11-20 | Stop reason: HOSPADM

## 2024-11-17 RX ORDER — IBUPROFEN 400 MG/1
400 TABLET ORAL EVERY 6 HOURS PRN
Status: DISCONTINUED | OUTPATIENT
Start: 2024-11-17 | End: 2024-11-20 | Stop reason: HOSPADM

## 2024-11-17 RX ORDER — LORAZEPAM 2 MG/ML
2 INJECTION INTRAMUSCULAR EVERY 6 HOURS PRN
Status: DISCONTINUED | OUTPATIENT
Start: 2024-11-17 | End: 2024-11-20 | Stop reason: HOSPADM

## 2024-11-17 RX ORDER — ROSUVASTATIN CALCIUM 10 MG/1
10 TABLET, COATED ORAL DAILY
Status: DISCONTINUED | OUTPATIENT
Start: 2024-11-17 | End: 2024-11-20 | Stop reason: HOSPADM

## 2024-11-17 RX ORDER — CHOLECALCIFEROL (VITAMIN D3) 25 MCG
1000 TABLET ORAL DAILY
Status: DISCONTINUED | OUTPATIENT
Start: 2024-11-17 | End: 2024-11-19

## 2024-11-17 RX ADMIN — INFLUENZA VIRUS VACCINE 0.5 ML: 15; 15; 15 SUSPENSION INTRAMUSCULAR at 12:43

## 2024-11-17 RX ADMIN — PROPRANOLOL HYDROCHLORIDE 60 MG: 60 CAPSULE, EXTENDED RELEASE ORAL at 12:42

## 2024-11-17 RX ADMIN — Medication 1000 UNITS: at 09:34

## 2024-11-17 RX ADMIN — IBUPROFEN 400 MG: 400 TABLET, FILM COATED ORAL at 10:35

## 2024-11-17 RX ADMIN — Medication 5 MG: at 21:17

## 2024-11-17 RX ADMIN — IBUPROFEN 400 MG: 400 TABLET, FILM COATED ORAL at 18:54

## 2024-11-17 RX ADMIN — CETIRIZINE HYDROCHLORIDE 10 MG: 10 TABLET, FILM COATED ORAL at 09:34

## 2024-11-17 RX ADMIN — ROSUVASTATIN CALCIUM 10 MG: 10 TABLET, FILM COATED ORAL at 09:32

## 2024-11-17 RX ADMIN — PANTOPRAZOLE SODIUM 40 MG: 40 TABLET, DELAYED RELEASE ORAL at 06:51

## 2024-11-17 RX ADMIN — DULOXETINE HYDROCHLORIDE 120 MG: 60 CAPSULE, DELAYED RELEASE ORAL at 09:34

## 2024-11-17 SDOH — SOCIAL STABILITY: SOCIAL INSECURITY: HAVE YOU HAD THOUGHTS OF HARMING ANYONE ELSE?: NO

## 2024-11-17 SDOH — SOCIAL STABILITY: SOCIAL INSECURITY: ABUSE: ADULT

## 2024-11-17 SDOH — ECONOMIC STABILITY: FOOD INSECURITY: WITHIN THE PAST 12 MONTHS, YOU WORRIED THAT YOUR FOOD WOULD RUN OUT BEFORE YOU GOT THE MONEY TO BUY MORE.: NEVER TRUE

## 2024-11-17 SDOH — ECONOMIC STABILITY: HOUSING INSECURITY: IN THE LAST 12 MONTHS, WAS THERE A TIME WHEN YOU WERE NOT ABLE TO PAY THE MORTGAGE OR RENT ON TIME?: NO

## 2024-11-17 SDOH — HEALTH STABILITY: MENTAL HEALTH: BEHAVIORS/MOOD: CALM;SLEEPING;COOPERATIVE

## 2024-11-17 SDOH — SOCIAL STABILITY: SOCIAL INSECURITY: DOES ANYONE TRY TO KEEP YOU FROM HAVING/CONTACTING OTHER FRIENDS OR DOING THINGS OUTSIDE YOUR HOME?: NO

## 2024-11-17 SDOH — HEALTH STABILITY: MENTAL HEALTH: BEHAVIORAL HEALTH(WDL): EXCEPTIONS TO WDL

## 2024-11-17 SDOH — SOCIAL STABILITY: SOCIAL INSECURITY: ARE THERE ANY APPARENT SIGNS OF INJURIES/BEHAVIORS THAT COULD BE RELATED TO ABUSE/NEGLECT?: NO

## 2024-11-17 SDOH — HEALTH STABILITY: MENTAL HEALTH: FOR HIGH RISK PATIENTS: 1:1 PATIENT OBSERVER AT ALL TIMES

## 2024-11-17 SDOH — SOCIAL STABILITY: SOCIAL INSECURITY: HAVE YOU HAD ANY THOUGHTS OF HARMING ANYONE ELSE?: NO

## 2024-11-17 SDOH — ECONOMIC STABILITY: INCOME INSECURITY: IN THE PAST 12 MONTHS HAS THE ELECTRIC, GAS, OIL, OR WATER COMPANY THREATENED TO SHUT OFF SERVICES IN YOUR HOME?: NO

## 2024-11-17 SDOH — ECONOMIC STABILITY: FOOD INSECURITY: WITHIN THE PAST 12 MONTHS, THE FOOD YOU BOUGHT JUST DIDN'T LAST AND YOU DIDN'T HAVE MONEY TO GET MORE.: NEVER TRUE

## 2024-11-17 SDOH — SOCIAL STABILITY: SOCIAL INSECURITY: WITHIN THE LAST YEAR, HAVE YOU BEEN AFRAID OF YOUR PARTNER OR EX-PARTNER?: NO

## 2024-11-17 SDOH — SOCIAL STABILITY: SOCIAL INSECURITY: DO YOU FEEL ANYONE HAS EXPLOITED OR TAKEN ADVANTAGE OF YOU FINANCIALLY OR OF YOUR PERSONAL PROPERTY?: NO

## 2024-11-17 SDOH — HEALTH STABILITY: MENTAL HEALTH: BEHAVIORS/MOOD: SLEEPING;CALM

## 2024-11-17 SDOH — HEALTH STABILITY: MENTAL HEALTH: EXPERIENCED ANY OF THE FOLLOWING LIFE EVENTS: SOCIAL LOSS (BANKRUPTCY, DIVORCE, WORK-RELATED STRESS)

## 2024-11-17 SDOH — SOCIAL STABILITY: SOCIAL INSECURITY: DO YOU FEEL UNSAFE GOING BACK TO THE PLACE WHERE YOU ARE LIVING?: NO

## 2024-11-17 SDOH — SOCIAL STABILITY: SOCIAL INSECURITY
WITHIN THE LAST YEAR, HAVE YOU BEEN RAPED OR FORCED TO HAVE ANY KIND OF SEXUAL ACTIVITY BY YOUR PARTNER OR EX-PARTNER?: NO

## 2024-11-17 SDOH — SOCIAL STABILITY: SOCIAL INSECURITY: ARE YOU OR HAVE YOU BEEN THREATENED OR ABUSED PHYSICALLY, EMOTIONALLY, OR SEXUALLY BY ANYONE?: NO

## 2024-11-17 SDOH — SOCIAL STABILITY: SOCIAL INSECURITY: HAS ANYONE EVER THREATENED TO HURT YOUR FAMILY OR YOUR PETS?: NO

## 2024-11-17 SDOH — SOCIAL STABILITY: SOCIAL INSECURITY: WITHIN THE LAST YEAR, HAVE YOU BEEN HUMILIATED OR EMOTIONALLY ABUSED IN OTHER WAYS BY YOUR PARTNER OR EX-PARTNER?: NO

## 2024-11-17 SDOH — SOCIAL STABILITY: SOCIAL INSECURITY: WERE YOU ABLE TO COMPLETE ALL THE BEHAVIORAL HEALTH SCREENINGS?: YES

## 2024-11-17 SDOH — ECONOMIC STABILITY: FOOD INSECURITY: HOW HARD IS IT FOR YOU TO PAY FOR THE VERY BASICS LIKE FOOD, HOUSING, MEDICAL CARE, AND HEATING?: NOT HARD AT ALL

## 2024-11-17 SDOH — SOCIAL STABILITY: SOCIAL INSECURITY: ARE YOU OR HAVE YOU BEEN THREATENED OR ABUSED PHYSICALLY, EMOTIONALLY, OR SEXUALLY BY ANYONE?: YES

## 2024-11-17 SDOH — SOCIAL STABILITY: SOCIAL INSECURITY
WITHIN THE LAST YEAR, HAVE YOU BEEN KICKED, HIT, SLAPPED, OR OTHERWISE PHYSICALLY HURT BY YOUR PARTNER OR EX-PARTNER?: NO

## 2024-11-17 ASSESSMENT — LIFESTYLE VARIABLES
ANXIETY: NO ANXIETY, AT EASE
HOW MANY STANDARD DRINKS CONTAINING ALCOHOL DO YOU HAVE ON A TYPICAL DAY: 1 OR 2
HOW OFTEN DO YOU HAVE 6 OR MORE DRINKS ON ONE OCCASION: NEVER
HOW MANY STANDARD DRINKS CONTAINING ALCOHOL DO YOU HAVE ON A TYPICAL DAY: 3 OR 4
HOW OFTEN DURING THE LAST YEAR HAVE YOU HAD A FEELING OF GUILT OR REMORSE AFTER DRINKING: NEVER
HOW OFTEN DURING THE LAST YEAR HAVE YOU FAILED TO DO WHAT WAS NORMALLY EXPECTED FROM YOU BECAUSE OF DRINKING: NEVER
HOW OFTEN DURING THE LAST YEAR HAVE YOU NEEDED AN ALCOHOLIC DRINK FIRST THING IN THE MORNING TO GET YOURSELF GOING AFTER A NIGHT OF HEAVY DRINKING: NEVER
SUBSTANCE_ABUSE_PAST_12_MONTHS: YES
SKIP TO QUESTIONS 9-10: 0
TOTAL_SCORE: 0
HEADACHE, FULLNESS IN HEAD: NOT PRESENT
CIWA TOTAL SCORE: 0
HAS A RELATIVE, FRIEND, DOCTOR, OR ANOTHER HEALTH PROFESSIONAL EXPRESSED CONCERN ABOUT YOUR DRINKING OR SUGGESTED YOU CUT DOWN: NO
ORIENTATION AND CLOUDING OF SENSORIUM: ORIENTED AND CAN DO SERIAL ADDITIONS
HOW OFTEN DURING THE LAST YEAR HAVE YOU BEEN UNABLE TO REMEMBER WHAT HAPPENED THE NIGHT BEFORE BECAUSE YOU HAD BEEN DRINKING: NEVER
AGITATION: NORMAL ACTIVITY
HAVE YOU OR SOMEONE ELSE BEEN INJURED AS A RESULT OF YOUR DRINKING: NO
AUDIT TOTAL SCORE: 0
PRESCIPTION_ABUSE_PAST_12_MONTHS: NO
TREMOR: NO TREMOR
AUDIT-C TOTAL SCORE: 4
PAROXYSMAL SWEATS: NO SWEAT VISIBLE
VISUAL DISTURBANCES: NOT PRESENT
HOW OFTEN DO YOU HAVE 6 OR MORE DRINKS ON ONE OCCASION: LESS THAN MONTHLY
AUDIT-C TOTAL SCORE: 4
SKIP TO QUESTIONS 9-10: 1
AUDIT TOTAL SCORE: 4
NAUSEA AND VOMITING: NO NAUSEA AND NO VOMITING
HOW OFTEN DO YOU HAVE A DRINK CONTAINING ALCOHOL: 2-4 TIMES A MONTH
AUDIT-C TOTAL SCORE: 4
AUDITORY DISTURBANCES: NOT PRESENT
HOW OFTEN DURING THE LAST YEAR HAVE YOU FOUND THAT YOU WERE NOT ABLE TO STOP DRINKING ONCE YOU HAD STARTED: NEVER
AUDIT-C TOTAL SCORE: 4
HOW OFTEN DO YOU HAVE A DRINK CONTAINING ALCOHOL: 4 OR MORE TIMES A WEEK

## 2024-11-17 ASSESSMENT — PAIN SCALES - GENERAL
PAINLEVEL_OUTOF10: 6
PAINLEVEL_OUTOF10: 0 - NO PAIN
PAINLEVEL_OUTOF10: 7
PAINLEVEL_OUTOF10: 7

## 2024-11-17 ASSESSMENT — ACTIVITIES OF DAILY LIVING (ADL)
LACK_OF_TRANSPORTATION: NO
DRESSING YOURSELF: INDEPENDENT
BATHING: INDEPENDENT
ADEQUATE_TO_COMPLETE_ADL: YES
HEARING - LEFT EAR: FUNCTIONAL
WALKS IN HOME: INDEPENDENT
PATIENT'S MEMORY ADEQUATE TO SAFELY COMPLETE DAILY ACTIVITIES?: YES
LACK_OF_TRANSPORTATION: NO
JUDGMENT_ADEQUATE_SAFELY_COMPLETE_DAILY_ACTIVITIES: YES
GROOMING: INDEPENDENT
TOILETING: INDEPENDENT
FEEDING YOURSELF: INDEPENDENT
HEARING - RIGHT EAR: FUNCTIONAL

## 2024-11-17 ASSESSMENT — PAIN DESCRIPTION - LOCATION
LOCATION: OTHER (COMMENT)
LOCATION: OTHER (COMMENT)

## 2024-11-17 ASSESSMENT — PAIN SCALES - WONG BAKER: WONGBAKER_NUMERICALRESPONSE: HURTS LITTLE MORE

## 2024-11-17 ASSESSMENT — PATIENT HEALTH QUESTIONNAIRE - PHQ9
1. LITTLE INTEREST OR PLEASURE IN DOING THINGS: SEVERAL DAYS
2. FEELING DOWN, DEPRESSED OR HOPELESS: SEVERAL DAYS
SUM OF ALL RESPONSES TO PHQ9 QUESTIONS 1 & 2: 1
1. LITTLE INTEREST OR PLEASURE IN DOING THINGS: NOT AT ALL
2. FEELING DOWN, DEPRESSED OR HOPELESS: SEVERAL DAYS
SUM OF ALL RESPONSES TO PHQ9 QUESTIONS 1 & 2: 2

## 2024-11-17 ASSESSMENT — PAIN - FUNCTIONAL ASSESSMENT: PAIN_FUNCTIONAL_ASSESSMENT: 0-10

## 2024-11-17 NOTE — SIGNIFICANT EVENT
Application for Emergency Admission      Ready for Transfer?  Is the patient medically cleared for transfer to inpatient psychiatry: Yes  Has the patient been accepted to an inpatient psychiatric hospital: Yes    Application for Emergency Admission  IN ACCORDANCE WITH [place]  The Chief Clinical Officer of: JEAN Canas 11/16/2024 .9:09 PM    Reason for Hospitalization  The undersigned has reason to believe that: Marquis Skelton Is a mentally ill person subject to hospitalization by court order under division B Section 5122.01 of [place] Revised Code, i.e., this person:    1.Yes  Represents a substantial risk of physical harm to self as manifested by evidence of threats of, or attempts at, suicide or serious self-inflicted bodily harm    2.No Represents a substantial risk of physical harm to others as manifested by evidence of recent homicidal or other violent behavior, evidence of recent threats that place another in reasonable fear of violent behavior and serious physical harm, or other evidence of present dangerousness    3.No Represents a substantial and immediate risk of serious physical impairment or injury to self as manifested by  evidence that the person is unable to provide for and is not providing for the person's basic physical needs because of the person's mental illness and that appropriate provision for those needs cannot be made  immediately available in the community    4.Yes Would benefit from treatment in a hospital for his mental illness and is in need of such treatment as manifested by evidence of behavior that creates a grave and imminent risk to substantial rights of others or  himself.    5.No Would benefit from treatment as manifested by evidence of behavior that indicates all of the following:       (a) The person is unlikely to survive safely in the community without supervision, based on a clinical determination.       (b) The person has a history of lack of compliance with  treatment for mental illness and one of the following applies:      (i) At least twice within the thirty-six months prior to the filing of an affidavit seeking court-ordered treatment of the person under section 5122.111 of the Revised Code, the lack of compliance has been a significant factor in necessitating hospitalization in a hospital or receipt of services in a forensic or other mental health unit of a correctional facility, provided that the thirty-six-month period shall be extended by the length of any hospitalization or incarceration of the person that occurred within the thirty-six-month period.      (ii) Within the forty-eight months prior to the filing of an affidavit seeking court-ordered treatment of the person under section 5122.111 of the Revised Code, the lack of compliance resulted in one or more acts of serious violent behavior toward self or others or threats of, or attempts at, serious physical harm to self or others, provided that the forty-eight-month period shall be extended by the length of any hospitalization or incarceration of the person that occurred within the forty-eight-month period.      (c) The person, as a result of mental illness, is unlikely to voluntarily participate in necessary treatment.       (d) In view of the person's treatment history and current behavior, the person is in need of treatment in order to prevent a relapse or deterioration that would be likely to result in substantial risk of serious harm to the person or others.    (e) Represents a substantial risk of physical harm to self or others if allowed to remain at liberty pending examination.    Therefore, it is requested that said person be admitted to the above named facility.    STATEMENT OF BELIEF    Must be filled out by one of the following: a psychiatrist, licensed physician, licensed clinical psychologist, health or ,  or .  (Statement shall include the circumstances under  which the individual was taken into custody and the reason for the person's belief that hospitalization is necessary. The statement shall also include a reference to efforts made to secure the individual's property at his residence if he was taken into custody there. Every reasonable and appropriate effort should be made to take this person into custody in the least conspicuous manner possible.)    Patient is presenting to the emergency department after multiple suicide attempts.  She attempted to cut her wrists as well as take pills.  Patient would benefit from inpatient psychiatric stabilization and treatment as she has posed a threat to herself.    Imtiaz Petty MD 11/16/2024     _____________________________________________________________   Place of Employment: [employer] [facility] [employer]    STATEMENT OF OBSERVATION BY PSYCHIATRIST, LICENSED PHYSICIAN, OR LICENSED CLINICAL PSYCHOLOGIST, IF APPLICABLE    Place of Observation (e.g., Critical access hospital mental [facility] center, general hospital, office, emergency facility)  (If applicable, please complete)    Imtiaz Petty MD 11/16/2024    _____________________________________________________________
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
## 2024-11-17 NOTE — GROUP NOTE
Group Topic: Art Creative   Group Date: 11/17/2024  Start Time: 1400  End Time: 1500  Facilitators: YISSEL Pedraza   Department: [facility] REHAB THERAPY VIRTUAL    Number of Participants: 5   Group Focus: leisure skills and social skills  Treatment Modality: Recreational Therapy   Interventions utilized were Acrylic Painting/Ceramics, exploration and leisure development  Purpose: other: creative expression, leisure awareness, social engagement     Name: Marquis Skelton YOB: 1980   MR: [id number]      Facilitator: Recreational Therapist  Level of Participation: active  Quality of Participation: appropriate/pleasant, cooperative, and engaged  Interactions with others: appropriate  Mood/Affect: appropriate, brightens with interaction, and positive  Triggers (if applicable): n/a  Cognition: coherent/clear and goal directed  Progress: Moderate  Comments: Patients were gathered to participate in Acrylic Painting/Ceramics. This activity works on creative expression, fine motor skills, following directions, positive social interaction, and leisure awareness.     Pt was pleasant, social with peers/staff, and engaged appropriately in creative task this afternoon.     Plan: continue with services

## 2024-11-17 NOTE — CARE PLAN
Problem: Community resource needs  Goal: Patient is receiving increased resource support to enhance ability to remain at home  Outcome: Progressing     Problem: Mental health issues  Goal: Stabilize adverse mental health factors affecting plan of care  Outcome: Progressing     Problem: Discharge Planning - Care Management  Goal: Discharge to post-acute care or home with appropriate resources  Outcome: Progressing     Problem: Access to Care Issue  Goal: Assess and Address Access Barriers  Outcome: Progressing     Problem: Assessment of Caregiver  Goal: Caregiver Demonstrates Personal Health and Wellbeing; as well as Ability to Safely and Effectively Care for Patient  Outcome: Progressing     Problem: Assistance Required for Daily Activities  Goal: Develop a Plan to Assist Patient with Activites of Daily Living  Outcome: Progressing     Problem: Coordination of Community Resources Needed  Goal: Coordination of Services will be Obtained  Outcome: Progressing     Problem: Coordination of Psychosocial Support Services Needed  Goal: Coordination of Services will be Obtained  Outcome: Progressing     Problem: Medication Adherence  Goal: Adherence to Medication Regimen  Outcome: Progressing     Problem: Financial Problem  Goal: Assess and Address Specific Financial Obstacles Affecting Patient's Adderence to Plan of Care  Outcome: Progressing  Goal: STG - Patient will complete simple calculations for time/money management  Outcome: Progressing     Problem: Risk of Exacerbation, or Readmission to Hospital Related to Symptoms of Comorbidities  Goal: Knowledge and Symptom Management of Chronic Disease will be Documented  Outcome: Progressing     Problem: Risk of Uncoordinated Care  Goal: Care will be Coordinated and Supported by a Multidisciplinary Team of Providers  Outcome: Progressing     Problem: Negative Experience, Conflict with, or Distrust of Providers and/or Health System  Goal: Plan to Address Patient Specific  Negative Experience, Distrust, or Conflict with Providers and/or Health System  Outcome: Progressing

## 2024-11-17 NOTE — CARE PLAN
The patient's goals for the shift include      The clinical goals for the shift include Maintain safety    Over the shift, the patient made progress towards care plan goals. Pt went to bed after admission assessment. Pt seen by medical this morning.

## 2024-11-17 NOTE — PROGRESS NOTES
" REHAB Therapy Assessment & Treatment    Patient Name: Marquis Skelton  MRN: [id number]  Today's Date: 11/17/2024      Activity Assessment:  Initial Assessment  Attention Span: 60 Minutes or more  Cognitive Behavior Status/Orientation: Person, Place, Time, Attentive, Capable  Crisis Triggers: Emotions, Family/friends, Mood, Other (Comment) (politics (political views differ from ), recent death of mother, disagreements with children)  Emotional Concerns/Mood/Affect: Calm, Cooperative, Friendly, Sad/tearful  Hearing: Adequate  Memory: Memory intact  Motivation Level: Minimum encouragement needed  Negative Coping Skills: Substance use (alcohol (occasional))  Speech/Communication/Socialization: Verbal  Vision: Adequate, Glasses    Leisure Survey:  [facility] Survey  Activity Preference: Group, Independent  Activity Tolerance: Good 30-60 minutes  At Home ADL Deficits: Other (Comment) (pt is independent)  Barriers to Leisure Participation: Emotions, Mood/affect, Physical issues/mobility/function (chronic pain)  Community Resources: Other (Comment) (outpatient support/services through [facility], bi-weekly therapy)  Creative Activities: Crafts, Needlework, Other (Comment) (cross-stitch, pt has a craft room at home, enjoys decorating/setting up \"calming spaces\" throughout house)  Education/School: associate's degree  Following Directions: Able to follow multi-step commands  Leisure Interests: Actively participates in leisure interests  Living Arrangement: Spouse  Motivators for Recreation/Leisure Involvement: Social interaction, Relaxation, Self-esteem/sense of accomplishment, Creative expression, Fun/entertainment, Sense of well being/contentment  Outdoor Activities: Trips/traveling, Other (Comment) (time outside/playing with dogs, enjoys traveling with )  Passive Games: Trivia games  Patient/Family Education Needs: safety awareness, healthy coping skills  Patient Strengths: \"hopelessly " "positive\", \"I love uncondtionally\"  Patient Weakness: \"knowing when I've reached my limit and when I need to take a break\"  Social/Group Activities: Rastafari/Moravian activities, Other (Comment), Pet therapy (time with nieces/nephews, time with , time with brothers, going out to eat, bible study/Confucianism,)  Solitary Activities: Watch/listen television, Music, Sudoku puzzles (true crime)  Special Hobbies: coping skills - cross-stitch, cleaning  Transportation: Drives  Work/Volunteer:  - works from home x 10 years  Additional Comments: Pt is a 44 year old F with a history of depression and anxiety, admitted following suicide attempt via overdose on medications and superficial cuts to wrist. Pt reports a variety of recents stressors including political differences with , recent death of mothers, and issues with children. Upon meeting, pt was calm, cooperative, and friendly. She shared openly about events leading up to suicide attempt and admission, describing them as \"impulsive\". She talked about her  \"gloating\" about Trump winning the election by wearing a hat, which was the \"final straw\" of a \"bunch of things piling up\". She became tearful and mentioned that she has been asking and \"praying for forgiveness\". Pt shared that she does has a \"very good\" relationship with her  overall and he is a great support and father. Pt was able to share a variety of leisure interests and healthy coping skills which includes cross-stitch, cleaning, time with family, and bible study. We discussed the daily program schedule and independent leisure materials available on the unit. Pt will be encouraged to attend a variety of groups during her stay.           Therapeutic Recreation:         Encounter Problems       Encounter Problems (Active)       Distress Tolerance  RT       To learn ways to manage stress       Start:  11/17/24    Expected End:  11/24/24               Emotional  RT       Mood "       Start:  11/17/24    Expected End:  11/24/24               Social       Stimulation       Start:  11/17/24    Expected End:  11/24/24                     Education Documentation  No documentation found.  Education Comments  No comments found.

## 2024-11-17 NOTE — GROUP NOTE
"Group Topic: Leisure Skills   Group Date: 11/17/2024  Start Time: 1100  End Time: 1145  Facilitators: YISSEL Pedraza   Department: [facility] REHAB THERAPY VIRTUAL    Number of Participants: 3   Group Focus: leisure skills and social skills  Treatment Modality: Recreational Therapy   Interventions utilized were Confident?, exploration, leisure development, and mental fitness  Purpose: other: cognitive skills/focus, health competition/teamwork, leisure awareness, social engagement     Name: Marquis Skelton YOB: 1980   MR: [id number]      Facilitator: Recreational Therapist  Level of Participation: active  Quality of Participation: appropriate/pleasant, cooperative, and engaged  Interactions with others: appropriate and gave feedback  Mood/Affect: appropriate, brightens with interaction, and positive  Triggers (if applicable): n/a  Cognition: coherent/clear and goal directed  Progress: Moderate  Comments: Patients were gathered to learn and participate in the game \"Confident?\". This activity works on cognitive skills, following directions, positive social interaction/teamwork, and promotes leisure awareness.     Pt was cooperative, pleasant, and fully engaged in group activity. 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     Plan: continue with services      "

## 2024-11-17 NOTE — NURSING NOTE
"Pt arrived to unit at 0230. Calm and cooperative with admission assessment. Pt denying current thoughts of SI and admits that her attempt was sudden and she didn't think it through. Pt reports recent stressors of her mother passing, her children stressing her out, and the elections results. Pt reports that she had a disagreement with her  about politics and that was the last straw. Pt signed SALLY for  - Jace Yuen. Pt stated reason for admission as \"I attempted to hurt myself\". Pt goal for admission \"To show that I am not suicidal\". Pt denied anxiety, depression, HI, and hallucinations.  "

## 2024-11-17 NOTE — PROGRESS NOTES
Emergency Medicine Transition of Care Note.    I received Marquis Skelton in signout from Dr. Goldberg.  Please see the previous ED provider note for all HPI, PE and MDM up to the time of signout at 1600. This is in addition to the primary record.    In brief Marquis Skelton is an 44 y.o. female presenting for   Chief Complaint   Patient presents with    Suicide Attempt    Drug Overdose     At the time of signout we were awaiting: placement    ED Course as of 11/17/24 0019   Fri Nov 15, 2024   2217 EKG performed at 2215 and interpreted by me shows sinus rhythm at a rate of 82.  Intervals are normal.  The axis is normal.  There are no ST or T wave changes.  No STEMI. [SP]   2359 CBC, CMP are within normal limits.  Acetaminophen salicylates are undetectable.  Alcohol is elevated at 67. [SP]      ED Course User Index  [SP] Shalini Brownlee, DO         Diagnoses as of 11/17/24 0019   Suicide attempt (Multi)       Medical Decision Making  Patient was accepted at [facility].  EMTALA form as well as pink slip were placed.  Patient was transferred in otherwise stable condition.    Final diagnoses:   [T14.91XA] Suicide attempt (Multi)           Procedure  Procedures    Imtiaz Petty MD

## 2024-11-17 NOTE — CONSULTS
Consults    Reason For Consult  Medical management of estrogen deficiency etc.    History Of Present Illness  Marquis Skelton is a 44 y.o. female with pertinent medical history of estrogen deficiency, vitamin D deficiency, lupus &  fibromyalgia who presented to [place] after overdosing on hydroxyzine.  Patient was sent to [facility] for further evaluation and treatment.  Hospitalist consulted for medical management.  She has no complaints in the review of systems at this time. EKG reviewed, positive ethanol 67, positive tox screen for cannabinoids and amphetamines (on Adderall).     Past Medical History  She has a past medical history of Arthralgia of multiple joints (01/19/2024), Chest pain (04/19/2024), Daytime somnolence (04/19/2024), Decreased range of motion of neck (01/19/2024), Dizziness (08/23/2023), Dysphagia (01/19/2024), Flushing (01/19/2024), Habitual snoring (04/19/2024), Impacted cerumen (01/19/2024), Low back pain (01/19/2024), Low back pain, unspecified (08/01/2022), Melena (01/19/2024), Muscle spasms of neck (01/19/2024), Nausea (08/23/2023), Otalgia of both ears (01/19/2024), Palpitations (08/23/2023), Personal history of other diseases of the female genital tract (06/10/2021), Personal history of other diseases of the musculoskeletal system and connective tissue (06/10/2021), Personal history of peptic ulcer disease (06/10/2021), and Strain of lumbar region (01/19/2024), GERD, gastritis, constipation, anemia, estrogen deficiency, major depression, obesity, endometriosis, fibromyalgia, gastric ulcer, vitamin D deficiency.    Surgical History  She has a past surgical history that includes Other surgical history (06/10/2021); Other surgical history (06/10/2021); Other surgical history (06/10/2021); and Other surgical history (06/10/2021), EGD, colonoscopy, gallbladder surgery and hysterectomy, laparoscopic, no surgery.     Social History  She reports that she has never smoked. She has never been  exposed to tobacco smoke. She has never used smokeless tobacco. She reports current alcohol use of about 10.0 standard drinks of alcohol per week. She reports that she does not use drugs.    Family History  Family History   Problem Relation Name Age of Onset    Other (cva) Mother      Diabetes Mother      Thyroid disease Mother      Pancreatic cancer Father       Allergies  Codeine, Topiramate, and Hydrocodone    Review of Systems  Constitutional:  Negative for chills, diaphoresis, fever.  HENT:  Negative for sore throat and trouble swallowing.    Eyes:  Negative for visual disturbance.        No visual changes.   Respiratory: Neg for cough, shortness of breath and wheezing.    Cardiovascular:  Negative for chest pain, palpitations, orthopnea and leg swelling.   Gastrointestinal:  Negative for abdominal pain, constipation, diarrhea, nausea and vomiting.   Genitourinary:  Negative for dysuria, frequency and urgency.   Musculoskeletal:  Negative for back pain and neck pain.        No focal weakness.  Skin:  +tattoos, scabbed lesions on the right forearm. No erythema.  Neurological:  Negative for dizziness, tremors, seizures, syncope, facial asymmetry, speech difficulty, weakness, light-headedness, numbness and headaches.   Psychiatric/Behavioral:  Negative for confusion and hallucinations. The patient is not nervous/anxious.      Physical Exam  Constitutional:       Appearance: Normal appearance. She is obese.   HENT:      Head: Normocephalic and atraumatic.      Mouth: Mucous membranes are moist.   Neck: No JVD or use of accessory muscles.  Eyes:      Extraocular Movements: Extraocular movements intact.      Conjunctiva/sclera: Conjunctivae normal.   Cardiovascular:      Rate and Rhythm: Normal rate and regular rhythm.      Pulses: Normal pulses.      Heart sounds: Normal heart sounds. No murmur heard.  Pulmonary:      Effort: No respiratory distress.      Breath sounds: No wheezing or rhonchi. No tachypnea & labored  breathing.  Abdominal:      General: Bowel sounds are normal. There is no distension.      Palpations: Abdomen is soft. There is no mass.      Tenderness: There is no abdominal tenderness. There is no guarding or rebound.   Extremities: No swelling and palpable distal pulses.   Musculoskeletal:         General: No swelling or tenderness.      Comments: No focal weakness.  Skin:     Coloration: Tattoos.  Neurological:      Mental Status: She is alert and oriented to person, place, and time.      Cranial Nerves: No cranial nerve deficit.      Sensory: No sensory deficit.      Motor: No focal weakness.     Last Recorded Vitals  /84 (BP Location: Left arm, Patient Position: Sitting)   Pulse 63   Temp 36.2 °C (97.2 °F) (Temporal)   Resp 16   SpO2 98%     Relevant Results  Scheduled medications  cetirizine, 10 mg, oral, Daily  cholecalciferol, 1,000 Units, oral, Daily  influenza, 0.5 mL, intramuscular, During hospitalization  pantoprazole, 40 mg, oral, Daily before breakfast  rosuvastatin, 10 mg, oral, Daily      Continuous medications     PRN medications  PRN medications: docusate sodium, haloperidol **OR** haloperidol lactate, hydrOXYzine HCL, LORazepam **OR** LORazepam, traZODone    No results found for this or any previous visit (from the past 24 hours).    No results found.    Assessment/Plan   SA via Ingestion & Cutting  Psych primary  Psych to f/U regarding routine labs  Scabbed lesions and no erythema    Lupus & Fibromyalgia   Only takes tylenol and motrin  Needs to f/U with rheumatology    HPLD  Lipid panel-f/U  Statin    GERD/Gastritis  PPI    Seasonal Allergies  Claritin    Vitamin D Deficiency  Vitamin d replacements  Vit d level-f/U    Estrogen Deficiency  Estrogen replacement from home can be used once verified by pharmacy    Social Alcohol Use/Positive Tox Screen  Cessation education regarding marijuana    Medical Clearance    Full Code    DVT Px  Encourage ambulation    Sarina Azevedo, APRN-CNP  60  min spent interviewing and assessing pt, placing orders and updating care plan.

## 2024-11-17 NOTE — GROUP NOTE
Group Topic: Community   Group Date: 11/17/2024  Start Time: 0945  End Time: 1000  Facilitators: YISSEL Pedraza   Department: [facility] REHAB THERAP[facility] VIRTUAL    Number of Participants: 4   Group Focus: community group and social skills  Treatment Modality: Recreational Therapy   Interventions utilized were Community Meeting, exploration, patient education, and support  Purpose: other: community meeting, social engagement     Name: Marquis Skelton YOB: 1980   MR: [id number]      Facilitator: Recreational Therapist  Level of Participation: moderate  Quality of Participation: appropriate/pleasant, cooperative, passive, and quiet  Interactions with others: appropriate  Mood/Affect: appropriate and flat  Triggers (if applicable): n/a  Cognition: coherent/clear  Progress: Moderate  Comments: This session included providing participants an opportunity to understand unit guidelines, rules, expectations, and provide a healthy environment to give suggestions for unit improvements. Community Meeting questionnaire slips were passed out and collected.    Pt was calm and cooperative during community meeting group. She did not have much input as a new patient to the unit, but appeared to be actively listening.     Plan: continue with services

## 2024-11-17 NOTE — SIGNIFICANT EVENT
11/17/24 1400   Discharge Planning   Living Arrangements Spouse/significant other   Support Systems Spouse/significant other;Friends/neighbors;Family members   Assistance Needed Independent   Type of Residence Private residence   Do you have animals or pets at home? Yes   Who is requesting discharge planning? Provider   Home or Post Acute Services Community services   Expected Discharge Disposition Home   Does the patient need discharge transport arranged? No   Financial Resource Strain   How hard is it for you to pay for the very basics like food, housing, medical care, and heating? Not very   Housing Stability   In the last 12 months, was there a time when you were not able to pay the mortgage or rent on time? N   At any time in the past 12 months, were you homeless or living in a shelter (including now)? N   Transportation Needs   In the past 12 months, has lack of transportation kept you from medical appointments or from getting medications? no   In the past 12 months, has lack of transportation kept you from meetings, work, or from getting things needed for daily living? No   Intensity of Service   Intensity of Service 0-30 min     Pt is here from home with . Anticipate she will return home once stabilized and continue with established OP Providers through [employer].

## 2024-11-17 NOTE — PROGRESS NOTES
Emergency Medicine Transition of Care Note.    I received Marquis Skelton in signout from Dr. Maldonado.  Please see the previous ED provider note for all HPI, PE and MDM up to the time of signout at 0030. This is in addition to the primary record.    In brief Marquis Skelton is an 44 y.o. female presenting for   Chief Complaint   Patient presents with    Suicide Attempt    Drug Overdose     At the time of signout we were awaiting: psychiatric placement    ED Course as of 11/17/24 0019   Fri Nov 15, 2024   2217 EKG performed at 2215 and interpreted by me shows sinus rhythm at a rate of 82.  Intervals are normal.  The axis is normal.  There are no ST or T wave changes.  No STEMI. [SP]   2359 CBC, CMP are within normal limits.  Acetaminophen salicylates are undetectable.  Alcohol is elevated at 67. [SP]      ED Course User Index  [SP] Shalini Brownlee DO         Diagnoses as of 11/17/24 0019   Suicide attempt (Multi)       Medical Decision Making  No new concerns were raised while under my care.    Final diagnoses:   [T14.91XA] Suicide attempt (Multi)           Procedure  Procedures    Shalini Brownlee DO
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
## 2024-11-17 NOTE — GROUP NOTE
"Group Topic: Coping Skills   Group Date: 11/17/2024  Start Time: 0930  End Time: 0945  Facilitators: YISSEL Pedraza   Department: [facility] REHAB THERAPY VIRTUAL    Number of Participants: 4   Group Focus: coping skills, psychiatric education, and self-awareness  Treatment Modality: Recreational Therapy, Nursing Students   Interventions utilized were Stress ID, Coping Skills, exploration, patient education, and support  Purpose: coping skills, insight or knowledge, and self-care    Name: Marquis Skelton YOB: 1980   MR: [id number]      Facilitator: Recreational Therapist  Level of Participation: active  Quality of Participation: appropriate/pleasant, cooperative, and engaged  Interactions with others: appropriate and gave feedback  Mood/Affect: appropriate, brightens with interaction, and flat  Triggers (if applicable): n/a  Cognition: coherent/clear and goal directed  Progress: Moderate  Comments: This was a nursing student led group session which provided patients with an outlet to express and identify personal stressors and education on healthy coping skills. Patients also completed a creative activity (flower picture/coloring).     Pt was calm, cooperative, and engaged in discussion/activity. Pt able to identify personal stressors of \"politics\" and \"finances\" and healthy coping skills that she utilizes including cross-stitching and cleaning.     Plan: continue with services      "

## 2024-11-18 PROBLEM — F32.2 CURRENT SEVERE EPISODE OF MAJOR DEPRESSIVE DISORDER WITHOUT PSYCHOTIC FEATURES (MULTI): Status: ACTIVE | Noted: 2024-11-18

## 2024-11-18 PROBLEM — Z65.8 PSYCHOSOCIAL STRESSORS: Status: ACTIVE | Noted: 2024-11-18

## 2024-11-18 PROBLEM — F43.21 GRIEF: Status: ACTIVE | Noted: 2024-11-18

## 2024-11-18 PROBLEM — F41.8 OTHER SPECIFIED ANXIETY DISORDERS: Status: ACTIVE | Noted: 2024-11-18

## 2024-11-18 LAB
ATRIAL RATE: 82 BPM
P AXIS: 9 DEGREES
PR INTERVAL: 140 MS
Q ONSET: 252 MS
QRS COUNT: 13 BEATS
QRS DURATION: 89 MS
QT INTERVAL: 381 MS
QTC CALCULATION(BAZETT): 445 MS
QTC FREDERICIA: 422 MS
R AXIS: 5 DEGREES
T AXIS: 58 DEGREES
T OFFSET: 443 MS
VENTRICULAR RATE: 82 BPM

## 2024-11-18 PROCEDURE — 2500000001 HC RX 250 WO HCPCS SELF ADMINISTERED DRUGS (ALT 637 FOR MEDICARE OP): Performed by: PSYCHIATRY & NEUROLOGY

## 2024-11-18 PROCEDURE — 1240000001 HC SEMI-PRIVATE BH ROOM DAILY

## 2024-11-18 PROCEDURE — 2500000001 HC RX 250 WO HCPCS SELF ADMINISTERED DRUGS (ALT 637 FOR MEDICARE OP): Performed by: NURSE PRACTITIONER

## 2024-11-18 PROCEDURE — 2500000002 HC RX 250 W HCPCS SELF ADMINISTERED DRUGS (ALT 637 FOR MEDICARE OP, ALT 636 FOR OP/ED): Performed by: NURSE PRACTITIONER

## 2024-11-18 PROCEDURE — 2500000002 HC RX 250 W HCPCS SELF ADMINISTERED DRUGS (ALT 637 FOR MEDICARE OP, ALT 636 FOR OP/ED): Performed by: PSYCHIATRY & NEUROLOGY

## 2024-11-18 PROCEDURE — 97150 GROUP THERAPEUTIC PROCEDURES: CPT | Mod: GO,CO

## 2024-11-18 PROCEDURE — 97165 OT EVAL LOW COMPLEX 30 MIN: CPT | Mod: GO | Performed by: OCCUPATIONAL THERAPIST

## 2024-11-18 PROCEDURE — 99233 SBSQ HOSP IP/OBS HIGH 50: CPT | Performed by: NURSE PRACTITIONER

## 2024-11-18 RX ORDER — HYDROXYZINE HYDROCHLORIDE 25 MG/1
25 TABLET, FILM COATED ORAL EVERY 6 HOURS PRN
Status: DISCONTINUED | OUTPATIENT
Start: 2024-11-18 | End: 2024-11-20 | Stop reason: HOSPADM

## 2024-11-18 RX ADMIN — TRAZODONE HYDROCHLORIDE 50 MG: 50 TABLET ORAL at 00:51

## 2024-11-18 RX ADMIN — IBUPROFEN 400 MG: 400 TABLET, FILM COATED ORAL at 20:01

## 2024-11-18 RX ADMIN — PANTOPRAZOLE SODIUM 40 MG: 40 TABLET, DELAYED RELEASE ORAL at 08:04

## 2024-11-18 RX ADMIN — Medication 1000 UNITS: at 08:29

## 2024-11-18 RX ADMIN — PROPRANOLOL HYDROCHLORIDE 60 MG: 60 CAPSULE, EXTENDED RELEASE ORAL at 08:29

## 2024-11-18 RX ADMIN — Medication 5 MG: at 20:02

## 2024-11-18 RX ADMIN — CETIRIZINE HYDROCHLORIDE 10 MG: 10 TABLET, FILM COATED ORAL at 08:29

## 2024-11-18 RX ADMIN — IBUPROFEN 400 MG: 400 TABLET, FILM COATED ORAL at 09:22

## 2024-11-18 RX ADMIN — ROSUVASTATIN CALCIUM 10 MG: 10 TABLET, FILM COATED ORAL at 08:29

## 2024-11-18 RX ADMIN — DULOXETINE HYDROCHLORIDE 120 MG: 60 CAPSULE, DELAYED RELEASE ORAL at 08:29

## 2024-11-18 RX ADMIN — TRAZODONE HYDROCHLORIDE 50 MG: 50 TABLET ORAL at 20:02

## 2024-11-18 ASSESSMENT — COLUMBIA-SUICIDE SEVERITY RATING SCALE - C-SSRS
1. SINCE LAST CONTACT, HAVE YOU WISHED YOU WERE DEAD OR WISHED YOU COULD GO TO SLEEP AND NOT WAKE UP?: NO
6. HAVE YOU EVER DONE ANYTHING, STARTED TO DO ANYTHING, OR PREPARED TO DO ANYTHING TO END YOUR LIFE?: NO
2. HAVE YOU ACTUALLY HAD ANY THOUGHTS OF KILLING YOURSELF?: NO

## 2024-11-18 ASSESSMENT — PAIN DESCRIPTION - LOCATION: LOCATION: GENERALIZED

## 2024-11-18 ASSESSMENT — PAIN - FUNCTIONAL ASSESSMENT
PAIN_FUNCTIONAL_ASSESSMENT: 0-10

## 2024-11-18 ASSESSMENT — PAIN SCALES - GENERAL
PAINLEVEL_OUTOF10: 1
PAINLEVEL_OUTOF10: 3
PAINLEVEL_OUTOF10: 4
PAINLEVEL_OUTOF10: 0 - NO PAIN

## 2024-11-18 NOTE — GROUP NOTE
"Group Topic: Goals   Group Date: 11/18/2024  Start Time: 0730  End Time: 0810  Facilitators: YISSEL Machado   Department: [facility] REHAB THERAPY VIRTUAL    Number of Participants: 2   Group Focus: check in, daily focus, and goals  Treatment Modality: Recreation Therapy  Interventions utilized were: Rules? What Rules? (Areas of Recovery), Goal Setting, exploration, orientation, and support  Purpose: Goal Identification, Recovery Concepts Discussion, insight or knowledge and self-care    Name: Marquis Skelton YOB: 1980   MR: [id number]      Facilitator: Recreational Therapist  Level of Participation: active  Quality of Participation: appropriate/pleasant, attentive, cooperative, engaged, and initiates communication  Interactions with others: appropriate and supportive  Mood/Affect: appropriate and positive  Triggers (if applicable): N/A  Cognition: coherent/clear, insightful, and capable  Progress: Moderate  Comments: A handout was provided and discussed that included six recovery concepts (be safe, be here, be honest, commit to goals, care for self and others, let go and move on).  Participants had the opportunity to share their thoughts about each of the areas and participate in a discussion on how to work on each. Patients were also asked to create a goal related to one or two of the categories.      Patient attended the entire session and completed all desired group tasks. Ms. Skelton shared her thoughts about many of the recovery areas. She discussed working with professional mental health support and identifying recovery concepts she's been actively working on. Patient identified the importance of \"caring for herself\" and better understanding herself as life roles change.      Plan: continue with services      "

## 2024-11-18 NOTE — CARE PLAN
"The patient's goals for the shift include \"sleep\"    The clinical goals for the shift include maintain safety    Over the shift, the patient made progress towards care plan goals. Patient rested quietly through the night. No additional PRNs given after melatonin and trazodone last night.  "

## 2024-11-18 NOTE — GROUP NOTE
"Group Topic: Leisure Skills   Group Date: 11/18/2024  Start Time: 1600  End Time: 1650  Facilitators: YISSEL Machado   Department: [facility] REHAB THERAPY VIRTUAL    Number of Participants: 6   Group Focus: concentration and leisure skills  Treatment Modality: Recreation Therapy  Interventions utilized were: That's It! (Game), leisure development and mental fitness  Purpose: Leisure Awareness, Pleasurable Activity, Social/Cognitive Stimulation, coping skills    Name: Marquis Skelton YOB: 1980   MR: [id number]      Facilitator: Recreational Therapist  Level of Participation: active  Quality of Participation: appropriate/pleasant, attentive, cooperative, and engaged  Interactions with others: appropriate, playful   Mood/Affect: appropriate and positive  Triggers (if applicable): N/A  Cognition: coherent/clear and capable  Progress: Moderate  Comments: This session involved a leisure activity called \"That's It!\".  The activity involved cognitive tasks, social interactions, healthy competition, leisure awareness, and a pleasurable experience. All participants were encouraged to listen to the rules, ask questions as needed, and participate in the activity to the best of their abilities.      Patient attended the entire session and completed all desired group tasks. She was attentive and appeared to enjoy the leisure activity.     Plan: continue with services      "

## 2024-11-18 NOTE — NURSING NOTE
"RN/writer met with patient in the dining area early this morning for routine shift assessment. Moved to hallway for patient privacy. Denied anxiety, Denied depression, \"No, I'm just really tired.\" Denied thoughts of harm to self or others. Denied SI or HI and contracted for safety. Denied AH, VH, TH. Endorsed pain \"Yeah, I'm hurtin' everywhere. I have lupus.\" Rated pain at 7-8 of 10. Stated \"I'm always at 3-4\" (baseline). Received Ibuprofen per PRN order. Described mood as \"sleepy\" (slight laugh). Described sleep as \"It was okay.\"    Coping skills: \"I was thinking if something is wrong, I'd ask. Talk about it.\"  Strengths: \"I can find a good thing in about every scenario.\"  Goals: \"I think just understanding where I'm supposed to be and when I'm supposed to be there.\" ([facility]     Patient attended groups today. Social with peers and staff.   Showered after dinner.   Medication adherent. Requested PRN ibuprofen twice this shift due to pain, with reduction of pain afterwards.  Q15 minutes safety checks maintained. Will continue to monitor.   "

## 2024-11-18 NOTE — NURSING NOTE
"Patient was assessed in hallway away from peers for patient's privacy. Patient presents as pleasant and calm. Patient out on the unit and social with peers this shift. Rated anxiety 0/10 and depression 0/10. Denied SI/HI. Denied auditory/visual/other hallucinations. Patient has been medication and group compliant. PRN pain medication Ibuprofen administered at 0922 for pain 3/10 generalized.  Patient's stated goal for today is \"want to know when I can go home, participate in groups\". Able to state positive coping skills such as \"cross stitching, playing with my dogs\". Patient has been cooperative this shift. Q 15 minute checks to be maintained throughout shift for safety.    "

## 2024-11-18 NOTE — CARE PLAN
"The patient's goals for the shift include \"want to know when I can go home, participate in groups\"    The clinical goals for the shift include maintain safety    Over the shift, the patient did make progress toward the following goals. Barriers to progression include acuteness of illness. Recommendations to address these barriers include maintain Q 15 minute rounds for patient safety.    "

## 2024-11-18 NOTE — PROGRESS NOTES
Occupational Therapy     REHAB Therapy Assessment & Treatment    Patient Name: Marquis Skelton  MRN: [id number]  Today's Date: 11/18/2024      Activity Assessment:  Initial Assessment  Attention Span: 60 Minutes or more  Cognitive Behavior Status/Orientation: Oriented to:, Person, Place, Time, Attentive, Situation  Crisis Triggers: Emotions, Family/friends, Other (Comment)  Emotional Concerns/Mood/Affect: Calm, Cooperative, Goal focused  Hearing: Adequate  Memory: Memory intact  Motivation Level: No encouragement needed  Negative Coping Skills: Substance use  Speech/Communication/Socialization: Verbal  Vision: Adequate  Additional Comments: OT Evaluation complete; POC established. See notes for care plan and details    Occupational Therapy Initial Evaluation- U  Admission Diagnosis: Suicide Attempt; pt presented to [place] ED after ingesting 20-27 10mg hydroxyzine and superficially cutting her wrist following an argument with her spouse about political views.    Reason for Referral: OT for Impaired coping and safety assessment  General Information   Past Medical History/History of Present Illness:   Pain: 0/10   Precautions: fall; restricted to unit   Appearance: well-groomed; appropriately dressed   Initial Response to Eval: agreeable   Current Stressors: work, death of mother, disagreement with spouse  Personal History   Marital Status:    Community Support: counselor and psychiatrist   Support System: family and friends   Living Situation: house   Prior Level of Function: independent   Level of Education: HS grad plus additional studies   Employment Status: full-time ; works remote x 10 years   Leisure Interests: family  Psychosocial/Cognition Assessment   Orientation: alert and oriented x 4   Attention: good   Follows Commands: WFL   Mood: appropriate; engaged; good eye-contact   Safety Awareness: good; reports regret of her decision to hurt herself   Patient Identified Life Roles:  "worker, spouse, mother   Patient Identified Goals-Short Term: \"take this time to reflects\" Long Term: \"not get so upset\"  Perceptual/Communication Skills   Speech (dysarthric, exp/rec aphasia, pressured, etc.): [facility]   Vision: [facility]   Perception (inattention, delusions, hallucinations, suicidal, etc): [facility]   Reality Based Behavior: [facility]   Perseverations: [facility]   Social Skills/Behavior: [facility]  Gross Range of Motion/ Strength   Hand Dominance: right   Upper Extremities: [facility]   Lower Extremities: [facility]  Basic Functional Mobility   Device Used: none; pt is independent  Activities of Daily Living   ADL: pt is independent overall  Instrumental Activities of Daily Living   Driving/ transportation: independent   Medication Management/Compliance: [facility]   Managing Finances: [facility]   Patient Reported Daily Routine/Responsibility: [facility]  Emotional/Mental Health Management   Patient Identified Coping Skills- Positive: music; family Negative: \"hurting myself\"   Knowledge of Illness/Emotions: good awareness   Stress Management: fair   Anger/Frustration Management: fair   Depression/Anxiety Management: fair   Patient Identified Strengths: \"I'm usually easy going\"   Patient Identified Weaknesses: \"taking care of everyone else\"     Pt agreeable to OT POC for attendance of 5x/week group sessions and/ or 1:1 sessions to address the goals established above prior to discharge.      Encounter Problems       Encounter Problems (Active)       OT Goals       Identify Goals       Start:  11/18/24    Expected End:  12/02/24       Pt will ID 2 STGs and 2 LTGs including methods to achieve goals after discharge to increase goal identification and planning skills.          Boundaries       Start:  11/18/24    Expected End:  12/02/24       Pt will be able to define healthy boundaries and be able to communicate the benefits of setting healthy boundaries.  Pt will be able to give 2 examples of a healthy boundary.          Affirmations       Start:  11/18/24    Expected " End:  12/02/24       Pt will ID 3 personal strengths and be able to state 2-3 positive affirmations to promote a healthy self-esteem.          Coping/Stress Management       Start:  11/18/24    Expected End:  12/02/24       Pt will ID 2 stressors and 2-3 stress management techniques to employ for improving coping with daily life tasks.           Community Resources       Start:  11/18/24    Expected End:  12/02/24       Pt will ID 2-3 community resources/programs to attend/join after discharge to improve support system and promote sobriety

## 2024-11-18 NOTE — ASSESSMENT & PLAN NOTE
- overdose of 20-27 hydroxyzine 10mg tabs, then used steak knife to make superficial cuts to her L wrist

## 2024-11-18 NOTE — ASSESSMENT & PLAN NOTE
Mother  2024  Political discord with   Attend groups/therapy  Refer to outpatient therapy        SLEEP  PRN melatonin  monitor  : slept 6hrs UB. Received PRN melatonin, then trazodone.      HX VIT D DEFICIENCY   CONT HOME VIT D3 1,000IU DAILY  Add on level    PRN MEDICATION   - Agitation: Benadryl 50mg PO/IM, Ativan 2mg PO/IM, Haldol 5mg PO/IM.  - Sleep: melatonin 5mg / trazodone 50mg  - Anxiety: hydroxyzine 25mg [id number]    MEDICAL  - IM service to follow  PER MEDICAL CONSULT   Lupus & Fibromyalgia   Only takes tylenol and motrin  Needs to f/U with rheumatology     HPLD  Lipid panel-f/U  Statin     GERD/Gastritis  PPI     Seasonal Allergies  Claritin     Vitamin D Deficiency  Vitamin d replacements  Vit d level-f/U     Estrogen Deficiency  Estrogen replacement from home can be used once verified by pharmacy     Social Alcohol Use/Positive Tox Screen  Cessation education regarding marijuana    DISPOSITION: ELOS <5 days  -Collateral from outside resource.   -SW consulted to assist, collateral, psychosocial issues, and disposition   -resume outpt psychotherapy after discharge   -resume psychiatric followup after dischargeMEDICAL  Estrogen deficiency  SLE  FM  RHIANNON with cpap  Hx vit d deficiency      Medication consent,  risks, benefits, side effects reviewed for all ordered meds and patient expressed understanding and consent obtained    I spent 50 minutes in the professional and overall care of this patient including:   preparation to eval patient, reviewing history, performing appropriate evaluation, counseling and educating patient (and/or family/CG), ordering/reviewing medications, ordering/reviewing tests/labs and independently interpreting results and communicating results to patient (and or family/CG), communicating/referring with other HC professionals, documenting clinical information in emr, care coordination    KARISHMA LIAO, APRN, CNP, PMHNP-BC

## 2024-11-18 NOTE — GROUP NOTE
Group Topic: Music Therapy   Group Date: 11/18/2024  Start Time: 0930  End Time: 1030  Facilitators: Zeynep Swann   Department: [facility] EXPRESSIVE THER VIRTUAL    Number of Participants: 5   Group Focus: communication/socialization, expressive outlet, growth + development, and music therapy  Treatment Modality: Music Therapy  Interventions Utilized were: active music engagement, empathic listening/validating emotions, and other active listening to recorded music    Pts listened to recorded music and had the opportunity for discussion. Pts also engaged in active drumming and chant re-creation.    Name: Marquis Skelton YOB: 1980   MR: [id number]      Level of Participation: active  Quality of Participation: appropriate/pleasant, cooperative, engaged, and initiates communication  Interactions with others: appropriate  Mood/Affect: appropriate and brightens with interaction  Cognition, Pre Treatment: attentive  Cognition, Post Treatment: attentive  Progress: Moderate  Plan: continue with services

## 2024-11-18 NOTE — PROGRESS NOTES
"Marquis Skelton is a 44 y.o. female on day 1 of admission presenting with Current severe episode of major depressive disorder without psychotic features (Multi).      Subjective   The patient was seen and examined, discussed in team report this morning. Reviewed chart and vital signs overnight. Reviewed history, physical, previous notes. Discussed with nursing staff.      PER RN 11/17 2100  Upon assessment pt calm, cooperative and friendly. Pt is social with peers and appropriate. Pt denied anxiety, depression, pain SI/HI and hallucinations. Strengths \"I have a good smile, and I get along with others\" coping skills \"doing puzzles\" and goal \"sleep\". Pt requested melatonin to help her sleep so Dr. Marshall was contacted and order was placed. Pt given 5 mg melatonin at 21:17. Pt came out at 00:50 still not able to sleep. Pt then given PRN trazodone 50 mg.       Team Held. Nursing reports Marquis denies anxiety and depression. Had trouble falling asleep.   Sleep reported as 6 hours unbroken. Received PRN melatonin then trazodone 2.5 hours later.    On interview, Marquis states she doesn't really remember taking the medication. Reports she was very overwhelmed \"at PTSD response\". States normally the duloxetine does fine for her and she wants to continue it. States she works from home as a . States her and her  had gone to therapy when they became emptinesters to learn how to communicate to each other again. States she has been grieving since the loss of her mother in July.    Marquis is attending groups. SHe is social in milieu.    No agitated behavioral issues noted. Patient is compliant with medications. No reported drug side effects. Will continue to monitor.         Objective     Last Recorded Vitals  Blood pressure 113/70, pulse 56, temperature 36.9 °C (98.4 °F), resp. rate 16, height 1.727 m (5' 8\"), weight 121 kg (266 lb 8.6 oz), SpO2 97%.    Scheduled medications  cetirizine, 10 mg, oral, " "Daily  cholecalciferol, 1,000 Units, oral, Daily  DULoxetine, 120 mg, oral, Daily  pantoprazole, 40 mg, oral, Daily before breakfast  propranolol LA, 60 mg, oral, Daily  rosuvastatin, 10 mg, oral, Daily      Continuous medications     PRN medications  PRN medications: acetaminophen, docusate sodium, haloperidol **OR** haloperidol lactate, hydrOXYzine HCL, ibuprofen, LORazepam **OR** LORazepam, melatonin, traZODone    MENTAL STATUS EXAM  Mental Status Exam  General: 45 yo CF with recent suicide attempt by overdose of hydroxyzine and then cut L wrist several times.  Appearance: appears stated age  Attitude:  calm and cooperative  Behavior:  normal eye contact  Motor Activity:  No agitation or retardation. No EPS/TD. Normal gait/station. normal muscle tone/bulk.  Speech:  Regular rate, rhythm, tone, volume. spontaneous, fluent.  Mood: \"ok\"  Affect: neutral  Thought Process:  Organized, linear, goal directed.   Thought Content: +SA, now denying SI.  Does not endorse homicidal ideation, no delusions elicited.  Thought Perception:  Does not endorse auditory/visual hallucinations. Does not appear to be responding to hallucinatory stimuli.  Cognition:  Alert, oriented x3. Adequate fund of knowledge. No deficit in recent and remote memory, language. Improved and without deficits of attention, concentration   Insight:  fair, as patient recognizes symptoms of illness and need for recommended treatments.  Judgement: gaining      RELEVANT RESULTS  Results for orders placed or performed during the hospital encounter of 11/17/24 (from the past 96 hours)   Glucose, Fasting   Result Value Ref Range    Glucose, Fasting 89 74 - 99 mg/dL   Lipid Panel   Result Value Ref Range    Cholesterol 166 0 - 199 mg/dL    HDL-Cholesterol 57.2 mg/dL    Cholesterol/HDL Ratio 2.9     LDL Calculated 90 <=99 mg/dL    VLDL 19 0 - 40 mg/dL    Triglycerides 95 0 - 149 mg/dL    Non HDL Cholesterol 109 0 - 149 mg/dL              Assessment/Plan   Marquis JIN " Nafisa is a 44 y.o. female with psychiatric hx of depression and medical history of estrogen deficiency, vitamin D deficiency, lupus & fibromyalgia who presented to [place] ED after overdosing on 20-27 hydroxyzine pills and used a steak knife to make superfical cuts to her left wrist. Pt is medically cleared and admitted to [facility] last night 24 for further evaluation and treatment. In ED positive ethanol 67, positive tox screen for cannabinoids and amphetamines (on Adderall)       - Legal Status: VOLUNTARY  - Cont monitor VS/orthostatic bp at least BID (potential medication side effects dizziness, sedation, low bp)  - Cont monitor patient's response to treatment, including medications.    - Cont monitor for emerging side effects and focus on safety issues and improved thought organization / emotional control.    - Encourage compliance with current medication treatment.     LABS  - 24: TSH 2.69  - Performed in ED 11/15:                  BMP, LFT, CBCD,  etoh 67, acetaminophen<10, acetylsalicylic acid<3,   - : UA, UTox (+amphetamine, +cannabinoid),  - : fasting lipid profile, glucose,   ADD ON Vitamin D      EKG (QTc)  - 11/15: 445    Assessment & Plan  Current severe episode of major depressive disorder without psychotic features (Multi)  -  CONT HOME DULOXETINE 120MG DAILY   Patient reports it has been effective  Other specified anxiety disorders  Anxiety that may trigger impulsive act  -  TRIAL INDERAL SR 60MG DAILY  - PRN hydroxyzine  Suicide attempt (Multi)  - overdose of 20-27 hydroxyzine 10mg tabs, then used steak knife to make superficial cuts to her L wrist      Grief  Grief handout given  Attend groups/therapy  Refer to outpatient therapy    Psychosocial stressors  Mother  2024  Political discord with   Attend groups/therapy  Refer to outpatient therapy        SLEEP  PRN melatonin  monitor  : slept 6hrs UB. Received PRN melatonin, then  trazodone.      HX VIT D DEFICIENCY  11/17 CONT HOME VIT D3 1,000IU DAILY  Add on level    PRN MEDICATION   - Agitation: Benadryl 50mg PO/IM, Ativan 2mg PO/IM, Haldol 5mg PO/IM.  - Sleep: melatonin 5mg / trazodone 50mg  - Anxiety: hydroxyzine 25mg [id number]    MEDICAL  - IM service to follow  PER MEDICAL CONSULT 11/17  Lupus & Fibromyalgia   Only takes tylenol and motrin  Needs to f/U with rheumatology     HPLD  Lipid panel-f/U  Statin     GERD/Gastritis  PPI     Seasonal Allergies  Claritin     Vitamin D Deficiency  Vitamin d replacements  Vit d level-f/U     Estrogen Deficiency  Estrogen replacement from home can be used once verified by pharmacy     Social Alcohol Use/Positive Tox Screen  Cessation education regarding marijuana    DISPOSITION: ELOS <5 days  -Collateral from outside resource.   -SW consulted to assist, collateral, psychosocial issues, and disposition   -resume outpt psychotherapy after discharge   -resume psychiatric followup after dischargeMEDICAL  Estrogen deficiency  SLE  FM  RHIANNON with cpap  Hx vit d deficiency      Medication consent,  risks, benefits, side effects reviewed for all ordered meds and patient expressed understanding and consent obtained    I spent 50 minutes in the professional and overall care of this patient including:   preparation to eval patient, reviewing history, performing appropriate evaluation, counseling and educating patient (and/or family/CG), ordering/reviewing medications, ordering/reviewing tests/labs and independently interpreting results and communicating results to patient (and or family/CG), communicating/referring with other HC professionals, documenting clinical information in emr, care coordination    KARISHMA LIAO, APRN, CNP, PMHNP-BC

## 2024-11-18 NOTE — CARE PLAN
"Discussed in Interdisciplinary Treatment Team today;  Patient is stabilizing;  Phoned , who admitted patient has been \"letting things bottle up\" and \"reached her breaking point\";  he stated he is not worried about her coming back home, not worried about her safety at discharge; that she sounds better; He agreed he will remove the firearm from the home before she returns, denies any other firearms in the home;  Informed him there is no ADOD yet.  He sounds appropriate and supportive.  May possibly recommend an IOP/PHP at discharge if patient is receptive; Plan is to continue current treatment plan, evaluate for safety and discharge when stabilized.    "

## 2024-11-18 NOTE — PROGRESS NOTES
"Occupational Therapy     REHAB Therapy Assessment & Treatment    Patient Name: Marquis Skelton  MRN: [id number]  Today's Date: 11/18/2024      Activity Assessment:    Music as a Coping Tool/Self Expression Group: [phone]  MONIQUE and Community Resources Group: [phone]  Personal Recovery and Understanding Mental Illness Group: [phone]    3/3 Groups attended     Pt present in all groups with G participation throughout. Pt demo's G attention to task and help seeking behaviors over the course of all groups. Pt demos G understanding how music can be a helpful tool for coping as well as able to share different genres of preferred music she enjoys. During MONIQUE group, pt continues to demo G understanding of education provided re: MONIQUE and other supports in the community as well as receptive to handouts provided. Pt asks appropriate questions and appropriately comments on the video provided during recovery group. Pt shares \"I really liked how they used a Mantra in the video as a way to stay motivated\" Pt with G progress toward OT goals as evidenced above. Pt would benefit from continued OT services in order to improve overall self-esteem, personal confidence and positive supports for safe transition at discharge.      Encounter Problems       Encounter Problems (Active)       OT Goals       Identify Goals (Progressing)       Start:  11/18/24    Expected End:  12/02/24       Pt will ID 2 STGs and 2 LTGs including methods to achieve goals after discharge to increase goal identification and planning skills.          Boundaries (Progressing)       Start:  11/18/24    Expected End:  12/02/24       Pt will be able to define healthy boundaries and be able to communicate the benefits of setting healthy boundaries.  Pt will be able to give 2 examples of a healthy boundary.          Affirmations (Progressing)       Start:  11/18/24    Expected End:  12/02/24       Pt will ID 3 personal strengths and be able to state 2-3 positive " affirmations to promote a healthy self-esteem.          Coping/Stress Management (Progressing)       Start:  11/18/24    Expected End:  12/02/24       Pt will ID 2 stressors and 2-3 stress management techniques to employ for improving coping with daily life tasks.           Community Resources (Progressing)       Start:  11/18/24    Expected End:  12/02/24       Pt will ID 2-3 community resources/programs to attend/join after discharge to improve support system and promote sobriety                   Additional Comments:    NEGRON collaborated with patients nurse and charge nurse throughout the day to provide appropriate support and encouragement to attend groups. Pt up on unit when NEGRON left last group of the day. All needs met.

## 2024-11-18 NOTE — GROUP NOTE
Group Topic: Art Creative   Group Date: 11/18/2024  Start Time: 1400  End Time: 1445  Facilitators: YISSEL Machado   Department: [facility] REHAB THERAPY VIRTUAL    Number of Participants: 4   Group Focus: icebreaker, goals, vision boards  Treatment Modality: Nursing Students, Recreation Therapy   Interventions utilized were: Vision Board Education and Creation, clarification, exploration, leisure development, and story telling  Purpose: coping skills, self-worth, and self-care    Name: Marquis Skelton YOB: 1980   MR: [id number]      Facilitator: Recreational Therapist  Level of Participation: active  Quality of Participation: appropriate/pleasant and cooperative  Interactions with others: appropriate  Mood/Affect: appropriate and calm  Triggers (if applicable): N/A  Cognition:  capable  Progress: Moderate  Comments: The nursing students presented information about processing personal goals and creating vision boards. Participants were provided an opportunity to create vision boards utilizing paper, stickers, markers, and crayons. Patients were encouraged to complete and share their projects. Social stimulation and encouragement was provided throughout.     Ms. Skelton attended most of the session and completed all desired group tasks. She shared her vision board goals and was social throughout.     Plan: continue with services

## 2024-11-19 PROBLEM — T14.91XA SUICIDE ATTEMPT (MULTI): Status: RESOLVED | Noted: 2024-11-16 | Resolved: 2024-11-19

## 2024-11-19 PROBLEM — F32.2 CURRENT SEVERE EPISODE OF MAJOR DEPRESSIVE DISORDER WITHOUT PSYCHOTIC FEATURES (MULTI): Status: RESOLVED | Noted: 2024-11-18 | Resolved: 2024-11-19

## 2024-11-19 PROBLEM — Z65.8 PSYCHOSOCIAL STRESSORS: Status: RESOLVED | Noted: 2024-11-18 | Resolved: 2024-11-19

## 2024-11-19 LAB — 25(OH)D3 SERPL-MCNC: 31 NG/ML (ref 30–100)

## 2024-11-19 PROCEDURE — 99233 SBSQ HOSP IP/OBS HIGH 50: CPT | Performed by: NURSE PRACTITIONER

## 2024-11-19 PROCEDURE — 97530 THERAPEUTIC ACTIVITIES: CPT | Mod: GO,CO

## 2024-11-19 PROCEDURE — 2500000002 HC RX 250 W HCPCS SELF ADMINISTERED DRUGS (ALT 637 FOR MEDICARE OP, ALT 636 FOR OP/ED): Performed by: NURSE PRACTITIONER

## 2024-11-19 PROCEDURE — 36415 COLL VENOUS BLD VENIPUNCTURE: CPT | Performed by: NURSE PRACTITIONER

## 2024-11-19 PROCEDURE — 2500000001 HC RX 250 WO HCPCS SELF ADMINISTERED DRUGS (ALT 637 FOR MEDICARE OP): Performed by: NURSE PRACTITIONER

## 2024-11-19 PROCEDURE — 2500000002 HC RX 250 W HCPCS SELF ADMINISTERED DRUGS (ALT 637 FOR MEDICARE OP, ALT 636 FOR OP/ED): Performed by: PSYCHIATRY & NEUROLOGY

## 2024-11-19 PROCEDURE — 82306 VITAMIN D 25 HYDROXY: CPT | Mod: GEALAB | Performed by: NURSE PRACTITIONER

## 2024-11-19 PROCEDURE — 97150 GROUP THERAPEUTIC PROCEDURES: CPT | Mod: GO,CO

## 2024-11-19 PROCEDURE — 2500000001 HC RX 250 WO HCPCS SELF ADMINISTERED DRUGS (ALT 637 FOR MEDICARE OP): Performed by: PSYCHIATRY & NEUROLOGY

## 2024-11-19 PROCEDURE — 1240000001 HC SEMI-PRIVATE BH ROOM DAILY

## 2024-11-19 RX ORDER — ACETAMINOPHEN 500 MG
5 TABLET ORAL NIGHTLY PRN
Qty: 30 TABLET | Refills: 0 | Status: SHIPPED | OUTPATIENT
Start: 2024-11-19 | End: 2024-12-19

## 2024-11-19 RX ORDER — PROPRANOLOL HYDROCHLORIDE 60 MG/1
60 CAPSULE, EXTENDED RELEASE ORAL DAILY
Qty: 30 CAPSULE | Refills: 0 | Status: SHIPPED | OUTPATIENT
Start: 2024-11-20 | End: 2024-12-20

## 2024-11-19 RX ORDER — ERGOCALCIFEROL 1.25 MG/1
1250 CAPSULE ORAL
Status: DISCONTINUED | OUTPATIENT
Start: 2024-11-20 | End: 2024-11-20 | Stop reason: HOSPADM

## 2024-11-19 RX ORDER — TRAZODONE HYDROCHLORIDE 50 MG/1
50 TABLET ORAL NIGHTLY PRN
Qty: 30 TABLET | Refills: 0 | Status: SHIPPED | OUTPATIENT
Start: 2024-11-19 | End: 2024-12-19

## 2024-11-19 RX ADMIN — PROPRANOLOL HYDROCHLORIDE 60 MG: 60 CAPSULE, EXTENDED RELEASE ORAL at 08:23

## 2024-11-19 RX ADMIN — IBUPROFEN 400 MG: 400 TABLET, FILM COATED ORAL at 20:42

## 2024-11-19 RX ADMIN — ROSUVASTATIN CALCIUM 10 MG: 10 TABLET, FILM COATED ORAL at 08:23

## 2024-11-19 RX ADMIN — Medication 1000 UNITS: at 08:23

## 2024-11-19 RX ADMIN — DULOXETINE HYDROCHLORIDE 120 MG: 60 CAPSULE, DELAYED RELEASE ORAL at 08:23

## 2024-11-19 RX ADMIN — TRAZODONE HYDROCHLORIDE 50 MG: 50 TABLET ORAL at 20:45

## 2024-11-19 RX ADMIN — IBUPROFEN 400 MG: 400 TABLET, FILM COATED ORAL at 06:21

## 2024-11-19 RX ADMIN — PANTOPRAZOLE SODIUM 40 MG: 40 TABLET, DELAYED RELEASE ORAL at 06:17

## 2024-11-19 RX ADMIN — CETIRIZINE HYDROCHLORIDE 10 MG: 10 TABLET, FILM COATED ORAL at 08:23

## 2024-11-19 ASSESSMENT — PAIN SCALES - PAIN ASSESSMENT IN ADVANCED DEMENTIA (PAINAD)
TOTALSCORE: 0
BREATHING: NORMAL
TOTALSCORE: MEDICATION (SEE MAR)
CONSOLABILITY: NO NEED TO CONSOLE
FACIALEXPRESSION: SMILING OR INEXPRESSIVE
BODYLANGUAGE: RELAXED

## 2024-11-19 ASSESSMENT — PAIN - FUNCTIONAL ASSESSMENT
PAIN_FUNCTIONAL_ASSESSMENT: 0-10
PAIN_FUNCTIONAL_ASSESSMENT: 0-10

## 2024-11-19 ASSESSMENT — PAIN SCALES - GENERAL
PAINLEVEL_OUTOF10: 6
PAINLEVEL_OUTOF10: 7
PAINLEVEL_OUTOF10: 0 - NO PAIN

## 2024-11-19 ASSESSMENT — PAIN SCALES - WONG BAKER: WONGBAKER_NUMERICALRESPONSE: HURTS LITTLE BIT

## 2024-11-19 ASSESSMENT — PAIN DESCRIPTION - ORIENTATION: ORIENTATION: MID

## 2024-11-19 ASSESSMENT — PAIN DESCRIPTION - LOCATION: LOCATION: GENERALIZED

## 2024-11-19 NOTE — CARE PLAN
"The patient's goals for the shift include \"sleep\"    The clinical goals for the shift include maintain safety    Over the shift, the patient did make progress toward the following goals. Barriers to progression include acuity of illness. Recommendations to address these barriers include continue with treatment plan.        "

## 2024-11-19 NOTE — GROUP NOTE
"Group Topic: Goals   Group Date: 11/19/2024  Start Time: 0730  End Time: 0805  Facilitators: YISSEL Machado   Department: [facility] REHAB THERAPY VIRTUAL    Number of Participants: 4   Group Focus: check in, daily focus, and goals  Treatment Modality: Recreation Therapy  Interventions utilized were: Today Is (Goal Worksheet), exploration, orientation, and support  Purpose: Goal Identification, coping skills and self-care    Name: Marquis Skelton YOB: 1980   MR: [id number]      Facilitator: Recreational Therapist  Level of Participation: moderate  Quality of Participation: attentive, cooperative, and engaged  Interactions with others: appropriate  Mood/Affect: appropriate, slightly anxious  Triggers (if applicable): N/A  Cognition:  capable  Progress: Moderate  Comments: Participants were provided a handout which included: date, goal, planning to take time today for recovery, planning to take time doing leisure activities, activities to try today, and using the thought for the day to make an individualized goal.     Patient discussed wanting to \"listen\" and \"pay attention to herself\" when sharing goals. With further discussion it appears Ms. Skelton wants to be more present and in the moment. She described being a person that is often \"thinking ahead\" and \"planning\". Patient also discussed wanting to \"nap\" today when needed.     Plan: continue with services      "

## 2024-11-19 NOTE — GROUP NOTE
Group Topic: Gross Motor/Balance Skills   Group Date: 11/19/2024  Start Time: 1630  End Time: 1700  Facilitators: YISSEL Machado   Department: [facility] REHAB THERAPY VIRTUAL    Number of Participants: 4   Group Focus: leisure skills, movement/physical activity  Treatment Modality: Recreation Therapy  Interventions utilized were: Pop Darts, leisure development  Purpose: Physical Activity, Leisure Awareness, Social Stimulation, Pleasurable Activity     Name: Marquis Skelton YOB: 1980   MR: [id number]      Facilitator: Recreational Therapist  Level of Participation: active  Quality of Participation: appropriate/pleasant, cooperative, engaged, and initiates communication  Interactions with others: appropriate  Mood/Affect: appropriate and positive  Triggers (if applicable): N/A  Cognition: coherent/clear and capable  Progress: Moderate  Comments: This physical activity involved coordinating movements, social interactions, healthy competition, leisure awareness, and a pleasurable experience.    Patient attended the entire session and completed all desired group tasks. She completed all movement tasks while standing. Patient appeared to enjoy the activity and company of her peers.     Plan: continue with services

## 2024-11-19 NOTE — DISCHARGE INSTRUCTIONS
PLEASE TAKE YOUR MEDICATION AS PRESCRIBED AND ATTEND YOUR FOLLOW-UP APPOINTMENT(S) AS SCHEDULED.     PLEASE CONTINUE TO ABSTAIN FROM /DO NOT USE ALCOHOL AND DRUGS (PHARMACEUTICAL OR STREET DRUGS) NOT PRESCRIBED TO YOU.      IN CASE OF EMERGENCY.  Call your outpatient psychiatrist right away or travel to the nearest emergency department if you have new or worsening mental health symptoms; unusual changes in behavior, mood, thoughts or feelings; thoughts of wanting to harm yourself or other people; or if you are experiencing serious medication side effects.   CALL 911 IN THE CASE OF AN EMERGENCY.     WHAT SHOULD I KNOW ABOUT STORAGE AND DISPOSAL OF MY MEDICATION(S)?  --Keep each medication in the container it came in, tightly closed, and out of reach of children.  --Take any medication that is outdated or no longer needed to your local police station for proper disposal.    WHAT OTHER INFORMATION SHOULD I KNOW?  --Keep all appointments with your doctor.  --Do not let anyone else take your medication(s). Ask your pharmacist any questions you have about refilling your prescription.  --It is important for you TO KEEP A WRITTEN LIST OF ALL THE PRESCRIPTION & NON-PRESCRIPTION (over-the-counter) MEDICINES YOU ARE TAKING, INCLUDING products such as vitamins, minerals, or other dietary supplements. You should bring this list with you each time you visit a doctor or if you are admitted to a hospital. It is also important information to carry with you in case of emergencies.      PLEASE BE AWARE that your recent abstinence from drugs while in the hospital PLACES YOU AT INCREASED RISK for unintentional overdose, and possibly death, if you were to relapse and start using drugs again.     Project ALYSS[employer] (Deaths Avoided With Naltrexone) is a community-based overdose education and naloxone distribution program. Project [employer] participants receive training on: Recognizing the signs and symptoms of overdose; Distinguishing between  different types of overdose; Performing rescue breathing; Calling emergency medical services; Administering intranasal Naloxone.  You will be given a list of [facility] ALYSSA sites in [place], in case you would like more information.

## 2024-11-19 NOTE — CARE PLAN
"The patient's goals for the shift include \"listen deeply to what I need, and quiet time when I need it\"    The clinical goals for the shift include maintain safety    Over the shift, the patient did make progress toward the following goals. Barriers to progression include acuteness of illness. Recommendations to address these barriers include maintain Q 15 minute rounds for patient safety.    "

## 2024-11-19 NOTE — PROGRESS NOTES
"Occupational Therapy     REHAB Therapy Assessment & Treatment    Patient Name: Marquis Skelton  MRN: [id number]  Today's Date: 11/19/2024      Activity Assessment:   Stress ID/Management Group: [id number]  Healthy Coping vs Unhealthy Coping Group: [id number]  Team Collaboration/ Cognitive Task Group: [id number]    1/3 Groups attended / 1:1 completed 1335-1350pm    Pt present during team collaboration group only as pt reports \"feeling nauseous\" during AM groups and unable to attend. During attended group, pt with continued G participation, G attention to task, and takes appropriate leadership role throughout. Pt does well socializing with peers and shares understanding of importance of engaging in leisure tasks in prep for discharge home. 1:1 initiated in pt's room with the door ajar and pt agreeable to same to discuss current progress, discharge planning, and community resources. Pt shares \"being happy to find out yesterday that [facility] offers groups for adults because I plan to be part of one now when I leave\" Pt willing to share her admission story as well as shares \"being thankful that I came here, I didn't realize how quickly I could be triggered by a PTSD episode\" Pt with G recognition of self improvements since being here and continues to demo G progress toward OT goals as evidenced above. Pt would benefit from continued OT services in order to improve overall self-esteem, personal confidence and positive supports for safe transition at discharge.      Encounter Problems       Encounter Problems (Active)       OT Goals       Identify Goals (Progressing)       Start:  11/18/24    Expected End:  12/02/24       Pt will ID 2 STGs and 2 LTGs including methods to achieve goals after discharge to increase goal identification and planning skills.          Boundaries (Progressing)       Start:  11/18/24    Expected End:  12/02/24       Pt will be able to define healthy boundaries and be able to communicate the benefits of " setting healthy boundaries.  Pt will be able to give 2 examples of a healthy boundary.          Affirmations (Progressing)       Start:  11/18/24    Expected End:  12/02/24       Pt will ID 3 personal strengths and be able to state 2-3 positive affirmations to promote a healthy self-esteem.          Coping/Stress Management (Progressing)       Start:  11/18/24    Expected End:  12/02/24       Pt will ID 2 stressors and 2-3 stress management techniques to employ for improving coping with daily life tasks.           Community Resources (Progressing)       Start:  11/18/24    Expected End:  12/02/24       Pt will ID 2-3 community resources/programs to attend/join after discharge to improve support system and promote sobriety                   Additional Comments:    NEGRON collaborated with patients nurse and charge nurse throughout the day to provide appropriate support and encouragement to attend groups. Pt up on unit when NEGRON left last group of the day. All needs met.

## 2024-11-19 NOTE — ASSESSMENT & PLAN NOTE
Mother  2024  Political discord with   Attend groups/therapy  Refer to outpatient therapy        SLEEP  PRN melatonin  monitor  : slept 6hrs UB. Received PRN melatonin, then trazodone.  : 9hrs UB      HX VIT D DEFICIENCY   CONT HOME VIT D3 1,000IU DAILY  Add on level pending    PRN MEDICATION   - Agitation: Benadryl 50mg PO/IM, Ativan 2mg PO/IM, Haldol 5mg PO/IM.  - Sleep: melatonin 5mg / trazodone 50mg  - Anxiety: hydroxyzine 25mg [id number]    MEDICAL  - IM service to follow  PER MEDICAL CONSULT   Lupus & Fibromyalgia   Only takes tylenol and motrin  Needs to f/U with rheumatology     HPLD  Lipid panel-f/U  Statin     GERD/Gastritis  PPI     Seasonal Allergies  Claritin     Vitamin D Deficiency  Vitamin d replacements  Vit d level-f/U     Estrogen Deficiency  Estrogen replacement from home can be used once verified by pharmacy     Social Alcohol Use/Positive Tox Screen  Cessation education regarding marijuana    DISPOSITION: PLAN TO DC TOMORROW IF REMAIN STABLE OVERNIGHT  -Collateral from outside resource.   -SW consulted to assist, collateral, psychosocial issues, and disposition   -resume outpt psychotherapy after discharge   -resume psychiatric followup after discharge    Medication consent,  risks, benefits, side effects reviewed for all ordered meds and patient expressed understanding and consent obtained    I spent 50 minutes in the professional and overall care of this patient including:   preparation to eval patient, reviewing history, performing appropriate evaluation, counseling and educating patient (and/or family/CG), ordering/reviewing medications, ordering/reviewing tests/labs and independently interpreting results and communicating results to patient (and or family/CG), communicating/referring with other HC professionals, documenting clinical information in emr, care coordination    KARIHSMA LIAO, APRN, CNP, PMHNP-BC

## 2024-11-19 NOTE — GROUP NOTE
Group Topic: Dialectical Behavioral Therapy - Mindfulness   Group Date: 11/19/2024  Start Time: 1400  End Time: 1450  Facilitators: YISSEL Machado   Department: [facility] REHAB THERAPY VIRTUAL    Number of Participants: 5   Group Focus: concentration, coping skills, mindfulness, and self-awareness  Treatment Modality: Recreation Therapy  Interventions utilized were: DBT-Mindfulness-Balancing Doing and Being Mind, clarification, exploration, and patient education  Purpose: coping skills, feelings, and insight or knowledge    Name: Marquis Skelton YOB: 1980   MR: [id number]      Facilitator: Recreational Therapist  Level of Participation: active  Quality of Participation: appropriate/pleasant, attentive, cooperative, engaged, and initiates communication  Interactions with others: appropriate and offered helpful suggestions  Mood/Affect: appropriate and positive  Triggers (if applicable): N/A  Cognition:  capable  Progress: Moderate  Comments: Group involved education on the doing, being, and wise minds.  We worked through the differences providing a variety of examples.  The session also involved ideas for practicing balancing the doing and being minds (readings, reminders, routines, staying in the moment, awareness of events, willingness, and slowing down doing mind step by step). All participants were encouraged to share thoughts, feeling, and ability to practice the coping strategies discussed.     Ms. Skelton attended the entire session and completed all desired group tasks. Patient appeared to understand the skill/information being discussed. She acknowledges being a doing mind person and needing improved mindfulness skills.      Plan: continue with services

## 2024-11-19 NOTE — PROGRESS NOTES
"Marquis Skelton is a 44 y.o. female on day 2 of admission presenting with Current severe episode of major depressive disorder without psychotic features (Multi).      Subjective   The patient was seen and examined, discussed in team report this morning. Reviewed chart and vital signs overnight. Reviewed history, physical, previous notes. Discussed with nursing staff.      PER RN 11/18 2151  Pt assessed in hallway away from others for privacy. Pt presents as friendly and anxious. Rated anxiety \"3/10.\" Denied depression, SI/HI, and AVH. Rated generalized pain \"4/10\" and PRN Ibuprofen given at 2001. Pt's goal for the shift was to \"sleep\" and listed positive coping skill of \"prayer.\" Pt's strengths are \"friendly\" and \"a team player.\" Pt requested PRN trazodone and melatonin for sleep- administered at 2001. Pt is cooperative and medication compliant       Team Held. Nursing reports Marquis denies anxiety and depression.  Sleep reported as 9 hours unbroken. Received PRN melatonin and trazodone last night.    On interview, Marquis states she is feeling ready for discharge. States she is talking to her  and he is very supportive. \"He threw away the Jair hat\", states they have been talking about how to go from here. She is future oriented and is attending all groups. States she has been able to feel at \"peace\" and \"reflect\" on what is important in her life.     Marquis is attending groups. SHe is social in milieu.    No agitated behavioral issues noted. Patient is compliant with medications. No reported drug side effects. Will continue to monitor.         Objective     Last Recorded Vitals  Blood pressure 124/84, pulse 54, temperature 36.4 °C (97.5 °F), temperature source Temporal, resp. rate 16, height 1.727 m (5' 8\"), weight 121 kg (266 lb 8.6 oz), SpO2 95%.    Scheduled medications  cetirizine, 10 mg, oral, Daily  cholecalciferol, 1,000 Units, oral, Daily  DULoxetine, 120 mg, oral, Daily  pantoprazole, 40 mg, oral, " "Daily before breakfast  propranolol LA, 60 mg, oral, Daily  rosuvastatin, 10 mg, oral, Daily      Continuous medications     PRN medications  PRN medications: acetaminophen, docusate sodium, haloperidol **OR** haloperidol lactate, hydrOXYzine HCL, ibuprofen, LORazepam **OR** LORazepam, melatonin, traZODone    MENTAL STATUS EXAM  Mental Status Exam  General: 43 yo CF with recent suicide attempt by overdose of hydroxyzine and then cut L wrist several times.  Appearance: appears stated age  Attitude:  calm and cooperative  Behavior:  normal eye contact  Motor Activity:  No agitation or retardation. No EPS/TD. Normal gait/station. normal muscle tone/bulk.  Speech:  Regular rate, rhythm, tone, volume. spontaneous, fluent.  Mood: \"ok\"  Affect: neutral  Thought Process:  Organized, linear, goal directed.   Thought Content: +SA, now denying SI.  Does not endorse homicidal ideation, no delusions elicited.  Thought Perception:  Does not endorse auditory/visual hallucinations. Does not appear to be responding to hallucinatory stimuli.  Cognition:  Alert, oriented x3. Adequate fund of knowledge. No deficit in recent and remote memory, language. Improved and without deficits of attention, concentration   Insight:  fair, as patient recognizes symptoms of illness and need for recommended treatments.  Judgement: gaining      RELEVANT RESULTS  Results for orders placed or performed during the hospital encounter of 11/17/24 (from the past 96 hours)   Glucose, Fasting   Result Value Ref Range    Glucose, Fasting 89 74 - 99 mg/dL   Lipid Panel   Result Value Ref Range    Cholesterol 166 0 - 199 mg/dL    HDL-Cholesterol 57.2 mg/dL    Cholesterol/HDL Ratio 2.9     LDL Calculated 90 <=99 mg/dL    VLDL 19 0 - 40 mg/dL    Triglycerides 95 0 - 149 mg/dL    Non HDL Cholesterol 109 0 - 149 mg/dL              Assessment/Plan   Marquis Skelton is a 44 y.o. female with psychiatric hx of depression and medical history of estrogen deficiency, " vitamin D deficiency, lupus & fibromyalgia who presented to [place] ED after overdosing on 20-27 hydroxyzine pills and used a steak knife to make superfical cuts to her left wrist. Pt is medically cleared and admitted to [facility] last night 24 for further evaluation and treatment. In ED positive ethanol 67, positive tox screen for cannabinoids and amphetamines (on Adderall)       - Legal Status: VOLUNTARY  - Cont monitor VS/orthostatic bp at least BID (potential medication side effects dizziness, sedation, low bp)  - Cont monitor patient's response to treatment, including medications.    - Cont monitor for emerging side effects and focus on safety issues and improved thought organization / emotional control.    - Encourage compliance with current medication treatment.     LABS  - 24: TSH 2.69  - Performed in ED 11/15:                  BMP, LFT, CBCD,  etoh 67, acetaminophen<10, acetylsalicylic acid<3,   - : UA, UTox (+amphetamine, +cannabinoid),  - : fasting lipid profile, glucose,   ADD ON Vitamin D (level pending)      EKG (QTc)  - 11/15: 445      Assessment & Plan  Current severe episode of major depressive disorder without psychotic features (Multi)  -  CONT HOME DULOXETINE 120MG DAILY   Patient reports it has been effective  Other specified anxiety disorders  Anxiety that may trigger impulsive act  -  TRIAL INDERAL SR 60MG DAILY  - PRN hydroxyzine  Suicide attempt (Multi)  - overdose of 20-27 hydroxyzine 10mg tabs, then used steak knife to make superficial cuts to her L wrist      Grief  Grief handout given  Attend groups/therapy  Refer to outpatient therapy    Psychosocial stressors  Mother  2024  Political discord with   Attend groups/therapy  Refer to outpatient therapy        SLEEP  PRN melatonin  monitor  : slept 6hrs UB. Received PRN melatonin, then trazodone.  : 9hrs UB      HX VIT D DEFICIENCY   CONT HOME VIT D3 1,000IU DAILY  Add on level  pending    PRN MEDICATION   - Agitation: Benadryl 50mg PO/IM, Ativan 2mg PO/IM, Haldol 5mg PO/IM.  - Sleep: melatonin 5mg / trazodone 50mg  - Anxiety: hydroxyzine 25mg [id number]    MEDICAL  - IM service to follow  PER MEDICAL CONSULT 11/17  Lupus & Fibromyalgia   Only takes tylenol and motrin  Needs to f/U with rheumatology     HPLD  Lipid panel-f/U  Statin     GERD/Gastritis  PPI     Seasonal Allergies  Claritin     Vitamin D Deficiency  Vitamin d replacements  Vit d level-f/U     Estrogen Deficiency  Estrogen replacement from home can be used once verified by pharmacy     Social Alcohol Use/Positive Tox Screen  Cessation education regarding marijuana    DISPOSITION: PLAN TO DC TOMORROW IF REMAIN STABLE OVERNIGHT  -Collateral from outside resource.   -SW consulted to assist, collateral, psychosocial issues, and disposition   -resume outpt psychotherapy after discharge   -resume psychiatric followup after discharge    Medication consent,  risks, benefits, side effects reviewed for all ordered meds and patient expressed understanding and consent obtained    I spent 50 minutes in the professional and overall care of this patient including:   preparation to eval patient, reviewing history, performing appropriate evaluation, counseling and educating patient (and/or family/CG), ordering/reviewing medications, ordering/reviewing tests/labs and independently interpreting results and communicating results to patient (and or family/CG), communicating/referring with other HC professionals, documenting clinical information in emr, care coordination    YOSEPH LEHMAN, CNP, PMHNP-BC

## 2024-11-19 NOTE — DISCHARGE INSTR - APPOINTMENTS
Outpatient Mental Health Follow Up Appointments:       Telehealth Appointment with Therapist, Donovan Dhaliwal, weekly therapy.   On  11/25/2024 at 11:00 AM.  Through  [facility],  [place] [facility],  [address],   [place] [address];    P. [phone];  F. [phone];      2.   Telehealth Appointment with APN: Tina Ewing, for medication management.   On  12/06/2024 at 11:30 AM.  through  [facility],  [address],   [place] [address];  P. [phone];  F. [phone];

## 2024-11-19 NOTE — NURSING NOTE
"Pt assessed in hallway away from others for privacy. Pt presents as friendly and anxious. Rated anxiety \"3/10.\" Denied depression, SI/HI, and AVH. Rated generalized pain \"4/10\" and PRN Ibuprofen given at 2001. Pt's goal for the shift was to \"sleep\" and listed positive coping skill of \"prayer.\" Pt's strengths are \"friendly\" and \"a team player.\" Pt requested PRN trazodone and melatonin for sleep- administered at 2001. Pt is cooperative and medication compliant.  "

## 2024-11-19 NOTE — NURSING NOTE
"Patient was assessed in patient's room away from peers for patient's privacy. Patient presents as friendly and calm. Patient out on the unit and social with peers this shift. Rated anxiety 0/10 and depression 0/10. Denied SI/HI. Denied auditory/visual/other hallucinations. Patient has been medication and group compliant. No PRN medications administered on this shift. No complaints of pain or discomfort. Patient's stated goal for today is \"listen deeply to what I need, and quiet time\". Able to state positive coping skills such as \"cross stitching, playing with my 3 dogs\". Patient has been cooperative with staff this shift. Q 15 minute checks to be maintained throughout shift for safety.    "

## 2024-11-19 NOTE — NURSING NOTE
"Pt had uneventful night. Pt rested quietly during the night. No changes from previous assessment. Q15 safety checks maintained.     0621- PRN ibuprofen given for \"6/10\" generalized pain.   "

## 2024-11-20 VITALS
HEIGHT: 68 IN | BODY MASS INDEX: 40.4 KG/M2 | OXYGEN SATURATION: 98 % | WEIGHT: 266.54 LBS | DIASTOLIC BLOOD PRESSURE: 67 MMHG | RESPIRATION RATE: 16 BRPM | TEMPERATURE: 97.7 F | SYSTOLIC BLOOD PRESSURE: 111 MMHG | HEART RATE: 61 BPM

## 2024-11-20 PROCEDURE — 2500000002 HC RX 250 W HCPCS SELF ADMINISTERED DRUGS (ALT 637 FOR MEDICARE OP, ALT 636 FOR OP/ED): Performed by: PSYCHIATRY & NEUROLOGY

## 2024-11-20 PROCEDURE — 2500000001 HC RX 250 WO HCPCS SELF ADMINISTERED DRUGS (ALT 637 FOR MEDICARE OP): Performed by: NURSE PRACTITIONER

## 2024-11-20 PROCEDURE — 99239 HOSP IP/OBS DSCHRG MGMT >30: CPT | Performed by: NURSE PRACTITIONER

## 2024-11-20 PROCEDURE — 97150 GROUP THERAPEUTIC PROCEDURES: CPT | Mod: GO,CO

## 2024-11-20 PROCEDURE — 2500000002 HC RX 250 W HCPCS SELF ADMINISTERED DRUGS (ALT 637 FOR MEDICARE OP, ALT 636 FOR OP/ED): Performed by: NURSE PRACTITIONER

## 2024-11-20 RX ORDER — DULOXETIN HYDROCHLORIDE 60 MG/1
120 CAPSULE, DELAYED RELEASE ORAL DAILY
Start: 2024-11-20

## 2024-11-20 RX ORDER — ERGOCALCIFEROL 1.25 MG/1
1250 CAPSULE ORAL
Qty: 8 CAPSULE | Refills: 0 | Status: SHIPPED | OUTPATIENT
Start: 2024-11-27 | End: 2025-01-16

## 2024-11-20 RX ADMIN — DULOXETINE HYDROCHLORIDE 120 MG: 60 CAPSULE, DELAYED RELEASE ORAL at 09:22

## 2024-11-20 RX ADMIN — CETIRIZINE HYDROCHLORIDE 10 MG: 10 TABLET, FILM COATED ORAL at 09:22

## 2024-11-20 RX ADMIN — ROSUVASTATIN CALCIUM 10 MG: 10 TABLET, FILM COATED ORAL at 09:22

## 2024-11-20 RX ADMIN — PROPRANOLOL HYDROCHLORIDE 60 MG: 60 CAPSULE, EXTENDED RELEASE ORAL at 09:23

## 2024-11-20 RX ADMIN — IBUPROFEN 400 MG: 400 TABLET, FILM COATED ORAL at 09:28

## 2024-11-20 RX ADMIN — ERGOCALCIFEROL 1250 MCG: 1.25 CAPSULE ORAL at 09:22

## 2024-11-20 RX ADMIN — PANTOPRAZOLE SODIUM 40 MG: 40 TABLET, DELAYED RELEASE ORAL at 06:34

## 2024-11-20 ASSESSMENT — COLUMBIA-SUICIDE SEVERITY RATING SCALE - C-SSRS
6. HAVE YOU EVER DONE ANYTHING, STARTED TO DO ANYTHING, OR PREPARED TO DO ANYTHING TO END YOUR LIFE?: NO
2. HAVE YOU ACTUALLY HAD ANY THOUGHTS OF KILLING YOURSELF?: NO
1. SINCE LAST CONTACT, HAVE YOU WISHED YOU WERE DEAD OR WISHED YOU COULD GO TO SLEEP AND NOT WAKE UP?: NO
2. HAVE YOU ACTUALLY HAD ANY THOUGHTS OF KILLING YOURSELF?: NO
1. SINCE LAST CONTACT, HAVE YOU WISHED YOU WERE DEAD OR WISHED YOU COULD GO TO SLEEP AND NOT WAKE UP?: NO
6. HAVE YOU EVER DONE ANYTHING, STARTED TO DO ANYTHING, OR PREPARED TO DO ANYTHING TO END YOUR LIFE?: NO

## 2024-11-20 ASSESSMENT — PAIN SCALES - GENERAL
PAINLEVEL_OUTOF10: 5 - MODERATE PAIN
PAINLEVEL_OUTOF10: 3

## 2024-11-20 ASSESSMENT — PAIN - FUNCTIONAL ASSESSMENT: PAIN_FUNCTIONAL_ASSESSMENT: 0-10
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
## 2024-11-20 NOTE — NURSING NOTE
"Pt interview in the front lounge  Pt is eating her snack and talking with another patient  Pt stated her day was \"good\"  Pt stated she is looking forward to going home tomorrow  Pt rated her anxiety 0/10  depression 0/10  pain 7/10 all over and denied everything else at this time  Pt stated her goal \" get sleep\"  her strength \"I like to do cross stitch\"  and her coping skill \" I pray\"  Pt is appropriate with her answers to the questions at this time   "

## 2024-11-20 NOTE — NURSING NOTE
"0945-The pt was calm and cooperative during the interview that took place in the hallway away from her peers for privacy. The pt denies anxiety and depression rating it 0/10, denies SI, HI and AVH at this time but rated her pain a 5/10 stating, \"I have lupus, so the pain is all over.\" Last bowel movement, \"Last night during the fire alarm\" 11/19/24. Coping skills, \"Groups.\" Goal for the day, \"To go home.\" Pt strength, \"listening and listening to myself.\" The pt was medication compliant with her morning medications. Q 15 minute monitoring continued.     1030- This nurse reviewed the discharge paperwork with the pt. This nurse reinforced the importance of keeping her follow up appointment as that is where she will receive her medication refills. The pt verbalized understanding. The pt verbalized her pharmacy does deliver within 24 hours and she knows the contact information if she needs to contact them. This nurse did highlight the number for the pt as well. Q 15 minute monitoring continued.     1104- The pt was discharged from the unit with all of her belongings.   "

## 2024-11-20 NOTE — GROUP NOTE
"Group Topic: Goals   Group Date: 11/20/2024  Start Time: 0740  End Time: 0810  Facilitators: YISSEL Machado   Department: [facility] REHAB THERAPY VIRTUAL    Number of Participants: 4   Group Focus: check in and goals  Treatment Modality: Recreation Therapy  Interventions utilized were: Setting Life Goals (worksheet),  exploration, orientation, and support  Purpose: Goal Identification, self-care    Name: Marquis Skelton YOB: 1980   MR: [id number]      Facilitator: Recreational Therapist  Level of Participation: active  Quality of Participation: appropriate/pleasant, attentive, cooperative, engaged, and initiates communication  Interactions with others: appropriate and offered helpful suggestions  Mood/Affect: appropriate and positive  Triggers (if applicable): N/A  Cognition: coherent/clear and capable  Progress: Moderate  Comments: Patients were provided a handout that included different areas of life (family/friends, leisure lifestyle, work/school, spirituality, physical, and mental health) and encouraged to think about each area and answer questions. Participants were provided examples and assistance to complete the activity. Questions included: what I’m doing well, where I need improvement, and my goals.     Patient attended the entire session and completed all desired group tasks. Ms. Skelton shared thoughts about multiple categories. Patient talked about her work and \"success\" she has experienced with \"growth\" and \"continuing to learn and support others\". She identified the importance for her to \"take breaks\" and time for herself. Through filling out the handout she identified that many of her goals overlap or are similar.     Plan: continue with services      "

## 2024-11-20 NOTE — PROGRESS NOTES
"Occupational Therapy     REHAB Therapy Assessment & Treatment    Patient Name: Marquis Skelton  MRN: [id number]  Today's Date: 11/20/2024      Activity Assessment:   Baggage and Making Positive Changes Group: [id number]  Acceptance and Letting Go Group: [id number]  Social Interaction and Leisure Awareness Group: [id number]    2/3 Groups attended     Pt present in all groups excluding social interaction as pt discharged prior. During attended groups, pt continues to demo appropriate mood, bright affect, and appropriate initiation to share aloud. Pt with G understanding baggage education and is able to share with good confidence \"feeling like I need to carry my Childrens burdens/decision with me sometimes, but I am learning they are adults now and make their own choices\" Pt appropriately validates peers during group discussion and is able to ID multiple insightful things in her life to let go of and things she needs to hold onto. Pt with G understanding of steps needed to improve her quality of life as well as how to begin to forgive herself for decisions she has made in her past. Pt continues to demo G progress toward OT goals as evidenced above.     Encounter Problems       Encounter Problems (Active)       OT Goals       Identify Goals (Progressing)       Start:  11/18/24    Expected End:  12/02/24       Pt will ID 2 STGs and 2 LTGs including methods to achieve goals after discharge to increase goal identification and planning skills.          Boundaries (Progressing)       Start:  11/18/24    Expected End:  12/02/24       Pt will be able to define healthy boundaries and be able to communicate the benefits of setting healthy boundaries.  Pt will be able to give 2 examples of a healthy boundary.          Affirmations (Progressing)       Start:  11/18/24    Expected End:  12/02/24       Pt will ID 3 personal strengths and be able to state 2-3 positive affirmations to promote a healthy self-esteem.          Coping/Stress " Management (Progressing)       Start:  11/18/24    Expected End:  12/02/24       Pt will ID 2 stressors and 2-3 stress management techniques to employ for improving coping with daily life tasks.           Community Resources (Progressing)       Start:  11/18/24    Expected End:  12/02/24       Pt will ID 2-3 community resources/programs to attend/join after discharge to improve support system and promote sobriety                     Additional Comments:    NEGRON collaborated with patients nurse and charge nurse throughout the day to provide appropriate support and encouragement to attend groups. Pt up on unit when NEGRON left last group of the day. All needs met.

## 2024-11-20 NOTE — ASSESSMENT & PLAN NOTE
Mother  2024  Political discord with   Attend groups/therapy  Refer to outpatient therapy          HX VIT D DEFICIENCY with current low normal level  Level 31   START VITAMIN D2 50,000IU WEEKLY X8 WEEKS  Vitamin D activates genes that regulate the immune system and release neurotransmitters that affect brain function and development. minimum level of 30 required to avoid adverse effects of vit d deficiency, but upper normal levels of 55-60, or potentially to 90 preferred for severe depression/anxiety/mental health issues. Monitor vit d levels every few months. https://doi.org/10.3109%[id number]. https://doi.org/10.3390%4Riuq17860283. d

## 2024-11-22 NOTE — SIGNIFICANT EVENT
Follow Up Phone Call    Outgoing phone call    Spoke to: Marquis Skelton Relationship:self   Phone number: [phone]      Outcome: I left a message on answering machine   No chief complaint on file.         Diagnosis:Not applicable

## 2025-02-21 ENCOUNTER — TELEPHONE (OUTPATIENT)
Dept: PRIMARY CARE | Facility: CLINIC | Age: 45
End: 2025-02-21
Payer: COMMERCIAL

## 2025-02-21 LAB
25(OH)D3+25(OH)D2 SERPL-MCNC: 41 NG/ML (ref 30–100)
ANA SER QL IF: NORMAL
ANION GAP SERPL CALCULATED.4IONS-SCNC: 11 MMOL/L (CALC) (ref 7–17)
BASOPHILS # BLD AUTO: 67 CELLS/UL (ref 0–200)
BASOPHILS NFR BLD AUTO: 0.7 %
BUN SERPL-MCNC: 13 MG/DL (ref 7–25)
BUN/CREAT SERPL: ABNORMAL (CALC) (ref 6–22)
CALCIUM SERPL-MCNC: 9.2 MG/DL (ref 8.6–10.2)
CHLORIDE SERPL-SCNC: 101 MMOL/L (ref 98–110)
CHOLEST SERPL-MCNC: 189 MG/DL
CHOLEST/HDLC SERPL: 3 (CALC)
CO2 SERPL-SCNC: 27 MMOL/L (ref 20–32)
CREAT SERPL-MCNC: 0.81 MG/DL (ref 0.5–0.99)
CRP SERPL-MCNC: 3 MG/L
EGFRCR SERPLBLD CKD-EPI 2021: 91 ML/MIN/1.73M2
EOSINOPHIL # BLD AUTO: 409 CELLS/UL (ref 15–500)
EOSINOPHIL NFR BLD AUTO: 4.3 %
ERYTHROCYTE [DISTWIDTH] IN BLOOD BY AUTOMATED COUNT: 15.3 % (ref 11–15)
ERYTHROCYTE [SEDIMENTATION RATE] IN BLOOD BY WESTERGREN METHOD: 17 MM/H
EST. AVERAGE GLUCOSE BLD GHB EST-MCNC: 111 MG/DL
EST. AVERAGE GLUCOSE BLD GHB EST-SCNC: 6.2 MMOL/L
GLUCOSE SERPL-MCNC: 102 MG/DL (ref 65–99)
HBA1C MFR BLD: 5.5 % OF TOTAL HGB
HCT VFR BLD AUTO: 42.6 % (ref 35–45)
HDLC SERPL-MCNC: 63 MG/DL
HGB BLD-MCNC: 13.2 G/DL (ref 11.7–15.5)
LDLC SERPL CALC-MCNC: 100 MG/DL (CALC)
LYMPHOCYTES # BLD AUTO: 2537 CELLS/UL (ref 850–3900)
LYMPHOCYTES NFR BLD AUTO: 26.7 %
MCH RBC QN AUTO: 25.9 PG (ref 27–33)
MCHC RBC AUTO-ENTMCNC: 31 G/DL (ref 32–36)
MCV RBC AUTO: 83.5 FL (ref 80–100)
MONOCYTES # BLD AUTO: 637 CELLS/UL (ref 200–950)
MONOCYTES NFR BLD AUTO: 6.7 %
NEUTROPHILS # BLD AUTO: 5852 CELLS/UL (ref 1500–7800)
NEUTROPHILS NFR BLD AUTO: 61.6 %
NONHDLC SERPL-MCNC: 126 MG/DL (CALC)
PLATELET # BLD AUTO: 361 THOUSAND/UL (ref 140–400)
PMV BLD REES-ECKER: 11.1 FL (ref 7.5–12.5)
POTASSIUM SERPL-SCNC: 4.5 MMOL/L (ref 3.5–5.3)
RBC # BLD AUTO: 5.1 MILLION/UL (ref 3.8–5.1)
RHEUMATOID FACT SERPL-ACNC: 10 IU/ML
SODIUM SERPL-SCNC: 139 MMOL/L (ref 135–146)
TRIGL SERPL-MCNC: 159 MG/DL
TSH SERPL-ACNC: 3.66 MIU/L
URATE SERPL-MCNC: 5.5 MG/DL (ref 2.5–7)
VIT B12 SERPL-MCNC: 426 PG/ML (ref 200–1100)
WBC # BLD AUTO: 9.5 THOUSAND/UL (ref 3.8–10.8)

## 2025-02-21 NOTE — TELEPHONE ENCOUNTER
----- Message from Melissa Moreno sent at 2/21/2025  7:05 AM EST -----  Triglycerides are elevated, a diet low in sugars and carbs will help lower this number and also a fish oil supplement over the counter will help. Still waiting on autoimmune factors. Other lab work is stable.

## 2025-02-21 NOTE — RESULT ENCOUNTER NOTE
Triglycerides are elevated, a diet low in sugars and carbs will help lower this number and also a fish oil supplement over the counter will help. Still waiting on autoimmune factors. Other lab work is stable.

## 2025-02-25 LAB
25(OH)D3+25(OH)D2 SERPL-MCNC: 41 NG/ML (ref 30–100)
ANA PAT SER IF-IMP: ABNORMAL
ANA PAT SER IF-IMP: ABNORMAL
ANA SER QL IF: POSITIVE
ANA TITR SER IF: ABNORMAL TITER
ANA TITR SER IF: ABNORMAL TITER
ANION GAP SERPL CALCULATED.4IONS-SCNC: 11 MMOL/L (CALC) (ref 7–17)
BASOPHILS # BLD AUTO: 67 CELLS/UL (ref 0–200)
BASOPHILS NFR BLD AUTO: 0.7 %
BUN SERPL-MCNC: 13 MG/DL (ref 7–25)
BUN/CREAT SERPL: ABNORMAL (CALC) (ref 6–22)
CALCIUM SERPL-MCNC: 9.2 MG/DL (ref 8.6–10.2)
CENTROMERE B AB SER-ACNC: ABNORMAL AI
CHLORIDE SERPL-SCNC: 101 MMOL/L (ref 98–110)
CHOLEST SERPL-MCNC: 189 MG/DL
CHOLEST/HDLC SERPL: 3 (CALC)
CO2 SERPL-SCNC: 27 MMOL/L (ref 20–32)
CREAT SERPL-MCNC: 0.81 MG/DL (ref 0.5–0.99)
CRP SERPL-MCNC: 3 MG/L
DSDNA AB SER-ACNC: <1 IU/ML
EGFRCR SERPLBLD CKD-EPI 2021: 91 ML/MIN/1.73M2
ENA JO1 AB SER IA-ACNC: ABNORMAL AI
ENA RNP AB SER-ACNC: ABNORMAL AI
ENA SCL70 AB SER IA-ACNC: ABNORMAL AI
ENA SM AB SER IA-ACNC: ABNORMAL AI
ENA SM+RNP AB SER IA-ACNC: ABNORMAL AI
ENA SS-A AB SER IA-ACNC: ABNORMAL AI
ENA SS-B AB SER IA-ACNC: ABNORMAL AI
EOSINOPHIL # BLD AUTO: 409 CELLS/UL (ref 15–500)
EOSINOPHIL NFR BLD AUTO: 4.3 %
ERYTHROCYTE [DISTWIDTH] IN BLOOD BY AUTOMATED COUNT: 15.3 % (ref 11–15)
ERYTHROCYTE [SEDIMENTATION RATE] IN BLOOD BY WESTERGREN METHOD: 17 MM/H
EST. AVERAGE GLUCOSE BLD GHB EST-MCNC: 111 MG/DL
EST. AVERAGE GLUCOSE BLD GHB EST-SCNC: 6.2 MMOL/L
GLUCOSE SERPL-MCNC: 102 MG/DL (ref 65–99)
HBA1C MFR BLD: 5.5 % OF TOTAL HGB
HCT VFR BLD AUTO: 42.6 % (ref 35–45)
HDLC SERPL-MCNC: 63 MG/DL
HGB BLD-MCNC: 13.2 G/DL (ref 11.7–15.5)
LABORATORY COMMENT REPORT: ABNORMAL
LDLC SERPL CALC-MCNC: 100 MG/DL (CALC)
LYMPHOCYTES # BLD AUTO: 2537 CELLS/UL (ref 850–3900)
LYMPHOCYTES NFR BLD AUTO: 26.7 %
MCH RBC QN AUTO: 25.9 PG (ref 27–33)
MCHC RBC AUTO-ENTMCNC: 31 G/DL (ref 32–36)
MCV RBC AUTO: 83.5 FL (ref 80–100)
MONOCYTES # BLD AUTO: 637 CELLS/UL (ref 200–950)
MONOCYTES NFR BLD AUTO: 6.7 %
NEUTROPHILS # BLD AUTO: 5852 CELLS/UL (ref 1500–7800)
NEUTROPHILS NFR BLD AUTO: 61.6 %
NONHDLC SERPL-MCNC: 126 MG/DL (CALC)
NUCLEOSOME AB SER IA-ACNC: ABNORMAL AI
PLATELET # BLD AUTO: 361 THOUSAND/UL (ref 140–400)
PMV BLD REES-ECKER: 11.1 FL (ref 7.5–12.5)
POTASSIUM SERPL-SCNC: 4.5 MMOL/L (ref 3.5–5.3)
RBC # BLD AUTO: 5.1 MILLION/UL (ref 3.8–5.1)
RHEUMATOID FACT SERPL-ACNC: 10 IU/ML
RIBOSOMAL P AB SER-ACNC: ABNORMAL AI
SODIUM SERPL-SCNC: 139 MMOL/L (ref 135–146)
TRIGL SERPL-MCNC: 159 MG/DL
TSH SERPL-ACNC: 3.66 MIU/L
URATE SERPL-MCNC: 5.5 MG/DL (ref 2.5–7)
VIT B12 SERPL-MCNC: 426 PG/ML (ref 200–1100)
WBC # BLD AUTO: 9.5 THOUSAND/UL (ref 3.8–10.8)

## 2025-03-14 ENCOUNTER — OFFICE VISIT (OUTPATIENT)
Dept: PRIMARY CARE | Facility: CLINIC | Age: 45
End: 2025-03-14
Payer: COMMERCIAL

## 2025-03-14 ENCOUNTER — APPOINTMENT (OUTPATIENT)
Dept: PRIMARY CARE | Facility: CLINIC | Age: 45
End: 2025-03-14
Payer: COMMERCIAL

## 2025-03-14 VITALS
SYSTOLIC BLOOD PRESSURE: 120 MMHG | WEIGHT: 278 LBS | HEART RATE: 74 BPM | HEIGHT: 68 IN | BODY MASS INDEX: 42.13 KG/M2 | DIASTOLIC BLOOD PRESSURE: 87 MMHG | OXYGEN SATURATION: 96 %

## 2025-03-14 DIAGNOSIS — T43.592A: ICD-10-CM

## 2025-03-14 DIAGNOSIS — E66.01 CLASS 3 SEVERE OBESITY DUE TO EXCESS CALORIES WITHOUT SERIOUS COMORBIDITY WITH BODY MASS INDEX (BMI) OF 40.0 TO 44.9 IN ADULT: ICD-10-CM

## 2025-03-14 DIAGNOSIS — E66.01 MORBID OBESITY (MULTI): ICD-10-CM

## 2025-03-14 DIAGNOSIS — G47.33 OBSTRUCTIVE SLEEP APNEA SYNDROME: ICD-10-CM

## 2025-03-14 DIAGNOSIS — F41.8 OTHER SPECIFIED ANXIETY DISORDERS: ICD-10-CM

## 2025-03-14 DIAGNOSIS — Z71.3 ENCOUNTER FOR WEIGHT LOSS COUNSELING: ICD-10-CM

## 2025-03-14 DIAGNOSIS — E66.813 CLASS 3 SEVERE OBESITY DUE TO EXCESS CALORIES WITHOUT SERIOUS COMORBIDITY WITH BODY MASS INDEX (BMI) OF 40.0 TO 44.9 IN ADULT: ICD-10-CM

## 2025-03-14 DIAGNOSIS — R76.8 POSITIVE ANTINUCLEAR ANTIBODY: ICD-10-CM

## 2025-03-14 DIAGNOSIS — R76.8 POSITIVE ANA (ANTINUCLEAR ANTIBODY): Primary | ICD-10-CM

## 2025-03-14 PROCEDURE — 3008F BODY MASS INDEX DOCD: CPT

## 2025-03-14 PROCEDURE — 1036F TOBACCO NON-USER: CPT

## 2025-03-14 PROCEDURE — 99214 OFFICE O/P EST MOD 30 MIN: CPT

## 2025-03-14 RX ORDER — SEMAGLUTIDE 0.5 MG/.5ML
0.5 INJECTION, SOLUTION SUBCUTANEOUS WEEKLY
Qty: 2 ML | Refills: 1 | Status: SHIPPED | OUTPATIENT
Start: 2025-03-14 | End: 2025-05-03

## 2025-03-14 RX ORDER — SEMAGLUTIDE 0.25 MG/.5ML
0.25 INJECTION, SOLUTION SUBCUTANEOUS WEEKLY
Qty: 2 ML | Refills: 0 | Status: SHIPPED | OUTPATIENT
Start: 2025-03-14 | End: 2025-04-05

## 2025-03-14 ASSESSMENT — ENCOUNTER SYMPTOMS
CARDIOVASCULAR NEGATIVE: 1
EYES NEGATIVE: 1
ENDOCRINE NEGATIVE: 1
GASTROINTESTINAL NEGATIVE: 1
DEPRESSION: 0
RESPIRATORY NEGATIVE: 1
ALLERGIC/IMMUNOLOGIC NEGATIVE: 1
HEMATOLOGIC/LYMPHATIC NEGATIVE: 1
CONSTITUTIONAL NEGATIVE: 1
NEUROLOGICAL NEGATIVE: 1
OCCASIONAL FEELINGS OF UNSTEADINESS: 0
PSYCHIATRIC NEGATIVE: 1
MUSCULOSKELETAL NEGATIVE: 1
LOSS OF SENSATION IN FEET: 0

## 2025-03-14 ASSESSMENT — PATIENT HEALTH QUESTIONNAIRE - PHQ9
2. FEELING DOWN, DEPRESSED OR HOPELESS: NOT AT ALL
1. LITTLE INTEREST OR PLEASURE IN DOING THINGS: NOT AT ALL
SUM OF ALL RESPONSES TO PHQ9 QUESTIONS 1 AND 2: 0

## 2025-03-14 ASSESSMENT — ANXIETY QUESTIONNAIRES
GAD7 TOTAL SCORE: 0
5. BEING SO RESTLESS THAT IT IS HARD TO SIT STILL: NOT AT ALL
2. NOT BEING ABLE TO STOP OR CONTROL WORRYING: NOT AT ALL
1. FEELING NERVOUS, ANXIOUS, OR ON EDGE: NOT AT ALL
7. FEELING AFRAID AS IF SOMETHING AWFUL MIGHT HAPPEN: NOT AT ALL
4. TROUBLE RELAXING: NOT AT ALL
6. BECOMING EASILY ANNOYED OR IRRITABLE: NOT AT ALL
3. WORRYING TOO MUCH ABOUT DIFFERENT THINGS: NOT AT ALL

## 2025-03-14 NOTE — ASSESSMENT & PLAN NOTE
Patient following with physiatrist. On duloxitine 60mg. Endorses is okay, no SI/HI thoughts. PHQ and DEENA both 0 today in office.    Yes

## 2025-03-14 NOTE — ASSESSMENT & PLAN NOTE
Endorses Hair thinning,  told her she has significantly thinned in her hair. AFIA was postive 1:320 with nonspecific TYSON. Will refer to Rheumatology today.

## 2025-03-14 NOTE — ASSESSMENT & PLAN NOTE
Patient following with physiatrist. On duloxitine 60mg. Endorses is okay, no SI/HI thoughts. PHQ and DEENA both 0 today in office.

## 2025-03-14 NOTE — PATIENT INSTRUCTIONS
Assessment/Plan   Problem List Items Addressed This Visit       Positive antinuclear antibody     Endorses Hair thinning,  told her she has significantly thinned in her hair. AFIA was postive 1:320 with nonspecific TYSON. Will refer to Rheumatology today.          Morbid obesity (Multi)     Concerned about weight gain, unable to loose weight. BMI is 42. Does intermittent fasting. Has been more active at home, 22765 steps daily. Cut out high carb and sugar. Diet and exercise discussed. Will order wegovy if insurance will cover for weight loss. Follow up in 2 months.          Obstructive sleep apnea syndrome     Has been using CPAP, was set up by company and has been working well for patient. No concerns.          Other specified anxiety disorders     Patient following with physiatrist. On duloxitine 60mg. Endorses is okay, no SI/HI thoughts. PHQ and DEENA both 0 today in office.          Poisoning by other antipsychotics and neuroleptics, intentional self-harm, initial encounter (Multi)     Patient following with physiatrist. On duloxitine 60mg. Endorses is okay, no SI/HI thoughts. PHQ and DEENA both 0 today in office.           Other Visit Diagnoses       Positive AFIA (antinuclear antibody)    -  Primary    Relevant Orders    Referral to Rheumatology    Class 3 severe obesity due to excess calories without serious comorbidity with body mass index (BMI) of 40.0 to 44.9 in adult        Relevant Orders    Follow Up In [facility] - PCP - Established    Encounter for weight loss counseling        Relevant Medications    semaglutide, weight loss, (Wegovy) 0.25 mg/0.5 mL pen injector    semaglutide, weight loss, (Wegovy) 0.5 mg/0.5 mL pen injector    Other Relevant Orders    Follow Up In [facility] - PCP - Established

## 2025-03-14 NOTE — PROGRESS NOTES
"Subjective   Patient ID: Marquis Skelton is a 45 y.o. female who presents for discuss CPAP and hair thinning.    Past Medical, Surgical, and Family History reviewed and updated in chart.     Reviewed all medications by prescribing practitioner or clinical pharmacist (such as prescriptions, OTCs, herbal therapies and supplements) and documented in the medical record.    HPI   45 yof in office for follow up.  Discussed most recent labs with patient.    Endorses Hair thinning,  told her she has significantly thinned in her hair. AFIA was postive 1:320 with nonspecific TYSON. Will refer to Rheumatology today.     Concerned about weight gain, unable to loose weight. BMI is 42. Does intermittent fasting. Has been more active at home, 47400 steps daily. Cut out high carb and sugar.     Has been using CPAP, was set up by company and has been working well for patient. No concerns.     Review of Systems   Constitutional: Negative.    HENT: Negative.          See HPI   Eyes: Negative.    Respiratory: Negative.     Cardiovascular: Negative.    Gastrointestinal: Negative.    Endocrine: Negative.    Genitourinary: Negative.    Musculoskeletal: Negative.    Skin: Negative.    Allergic/Immunologic: Negative.    Neurological: Negative.    Hematological: Negative.    Psychiatric/Behavioral: Negative.     All other systems reviewed and are negative.      Objective   /87 (BP Location: Left arm, Patient Position: Sitting, BP Cuff Size: Large adult)   Pulse 74   Ht 1.727 m (5' 8\")   Wt 126 kg (278 lb)   SpO2 96%   BMI 42.27 kg/m²     Physical Exam  Constitutional:       Appearance: Normal appearance.   HENT:      Head: Normocephalic and atraumatic.   Cardiovascular:      Rate and Rhythm: Normal rate and regular rhythm.      Pulses: Normal pulses.      Heart sounds: Normal heart sounds.   Pulmonary:      Effort: Pulmonary effort is normal.      Breath sounds: Normal breath sounds.   Skin:     General: Skin is warm and " dry.   Neurological:      General: No focal deficit present.      Mental Status: She is alert and oriented to person, place, and time.   Psychiatric:         Mood and Affect: Mood normal.         Behavior: Behavior normal.         Thought Content: Thought content normal.         Judgment: Judgment normal.         Assessment/Plan   Problem List Items Addressed This Visit       Positive antinuclear antibody     Endorses Hair thinning,  told her she has significantly thinned in her hair. AFIA was postive 1:320 with nonspecific TYSON. Will refer to Rheumatology today.          Morbid obesity (Multi)     Concerned about weight gain, unable to loose weight. BMI is 42. Does intermittent fasting. Has been more active at home, 96192 steps daily. Cut out high carb and sugar. Diet and exercise discussed. Will order wegovy if insurance will cover for weight loss. Follow up in 2 months.          Obstructive sleep apnea syndrome     Has been using CPAP, was set up by company and has been working well for patient. No concerns.          Other specified anxiety disorders     Patient following with physiatrist. On duloxitine 60mg. Endorses is okay, no SI/HI thoughts. PHQ and DEENA both 0 today in office.          Poisoning by other antipsychotics and neuroleptics, intentional self-harm, initial encounter (Multi)     Patient following with physiatrist. On duloxitine 60mg. Endorses is okay, no SI/HI thoughts. PHQ and DEENA both 0 today in office.           Other Visit Diagnoses       Positive AFIA (antinuclear antibody)    -  Primary    Relevant Orders    Referral to Rheumatology    Class 3 severe obesity due to excess calories without serious comorbidity with body mass index (BMI) of 40.0 to 44.9 in adult        Relevant Orders    Follow Up In [facility] - PCP - Established    Encounter for weight loss counseling        Relevant Medications    semaglutide, weight loss, (Wegovy) 0.25 mg/0.5 mL pen injector     semaglutide, weight loss, (Wegovy) 0.5 mg/0.5 mL pen injector    Other Relevant Orders    Follow Up In [facility] - PCP - Established

## 2025-03-14 NOTE — ASSESSMENT & PLAN NOTE
Concerned about weight gain, unable to loose weight. BMI is 42. Does intermittent fasting. Has been more active at home, 58516 steps daily. Cut out high carb and sugar. Diet and exercise discussed. Will order wegovy if insurance will cover for weight loss. Follow up in 2 months.

## 2025-05-27 ENCOUNTER — APPOINTMENT (OUTPATIENT)
Dept: PRIMARY CARE | Facility: CLINIC | Age: 45
End: 2025-05-27
Payer: COMMERCIAL

## 2025-05-27 VITALS
OXYGEN SATURATION: 96 % | DIASTOLIC BLOOD PRESSURE: 80 MMHG | HEART RATE: 65 BPM | HEIGHT: 68 IN | SYSTOLIC BLOOD PRESSURE: 116 MMHG | WEIGHT: 286 LBS | BODY MASS INDEX: 43.35 KG/M2

## 2025-05-27 DIAGNOSIS — E66.813 CLASS 3 SEVERE OBESITY DUE TO EXCESS CALORIES WITHOUT SERIOUS COMORBIDITY WITH BODY MASS INDEX (BMI) OF 40.0 TO 44.9 IN ADULT: ICD-10-CM

## 2025-05-27 DIAGNOSIS — Z71.3 ENCOUNTER FOR WEIGHT LOSS COUNSELING: ICD-10-CM

## 2025-05-27 DIAGNOSIS — R76.8 POSITIVE ANTINUCLEAR ANTIBODY: ICD-10-CM

## 2025-05-27 DIAGNOSIS — F32.A DEPRESSIVE DISORDER: ICD-10-CM

## 2025-05-27 DIAGNOSIS — Z12.31 ENCOUNTER FOR SCREENING MAMMOGRAM FOR MALIGNANT NEOPLASM OF BREAST: ICD-10-CM

## 2025-05-27 DIAGNOSIS — Z00.00 HEALTH CARE MAINTENANCE: ICD-10-CM

## 2025-05-27 DIAGNOSIS — F41.8 OTHER SPECIFIED ANXIETY DISORDERS: ICD-10-CM

## 2025-05-27 DIAGNOSIS — F43.21 GRIEF: ICD-10-CM

## 2025-05-27 DIAGNOSIS — G47.33 OBSTRUCTIVE SLEEP APNEA SYNDROME: Primary | ICD-10-CM

## 2025-05-27 PROBLEM — T43.592A: Status: RESOLVED | Noted: 2025-03-14 | Resolved: 2025-05-27

## 2025-05-27 PROCEDURE — 99396 PREV VISIT EST AGE 40-64: CPT

## 2025-05-27 PROCEDURE — 99214 OFFICE O/P EST MOD 30 MIN: CPT

## 2025-05-27 PROCEDURE — 3008F BODY MASS INDEX DOCD: CPT

## 2025-05-27 PROCEDURE — 1036F TOBACCO NON-USER: CPT

## 2025-05-27 ASSESSMENT — ENCOUNTER SYMPTOMS
LOSS OF SENSATION IN FEET: 0
NEUROLOGICAL NEGATIVE: 1
ALLERGIC/IMMUNOLOGIC NEGATIVE: 1
ENDOCRINE NEGATIVE: 1
MUSCULOSKELETAL NEGATIVE: 1
CARDIOVASCULAR NEGATIVE: 1
HEMATOLOGIC/LYMPHATIC NEGATIVE: 1
RESPIRATORY NEGATIVE: 1
EYES NEGATIVE: 1
DEPRESSION: 0
PSYCHIATRIC NEGATIVE: 1
OCCASIONAL FEELINGS OF UNSTEADINESS: 0
GASTROINTESTINAL NEGATIVE: 1
CONSTITUTIONAL NEGATIVE: 1

## 2025-05-27 ASSESSMENT — PATIENT HEALTH QUESTIONNAIRE - PHQ9
2. FEELING DOWN, DEPRESSED OR HOPELESS: NOT AT ALL
SUM OF ALL RESPONSES TO PHQ9 QUESTIONS 1 AND 2: 0
1. LITTLE INTEREST OR PLEASURE IN DOING THINGS: NOT AT ALL

## 2025-05-27 NOTE — PROGRESS NOTES
"Subjective   Patient ID: Marquis Skelton is a 45 y.o. female who presents for Follow-up (Weight loss follow up, patient states she could not afford the wegovy. ) and Annual Exam.    Past Medical, Surgical, and Family History reviewed and updated in chart.     Reviewed all medications by prescribing practitioner or clinical pharmacist (such as prescriptions, OTCs, herbal therapies and supplements) and documented in the medical record.    HPI   45 yof in office for annual visit and follow up on weight loss.   Did not get wegovy that was ordered at last visit it was too expensive. Would like to discuss different weight loss options.   No other concerns.      Eye doctor yearly Glasses  Dentist every 6 months.    Review of Systems   Constitutional: Negative.    HENT: Negative.     Eyes: Negative.    Respiratory: Negative.     Cardiovascular: Negative.    Gastrointestinal: Negative.    Endocrine: Negative.    Genitourinary: Negative.    Musculoskeletal: Negative.    Skin: Negative.    Allergic/Immunologic: Negative.    Neurological: Negative.    Hematological: Negative.    Psychiatric/Behavioral: Negative.     All other systems reviewed and are negative.      Objective   /80   Pulse 65   Ht 1.727 m (5' 7.99\")   Wt 130 kg (286 lb)   SpO2 96%   BMI 43.50 kg/m²     Physical Exam  Constitutional:       Appearance: Normal appearance.   HENT:      Head: Normocephalic and atraumatic.      Nose: Nose normal.      Mouth/Throat:      Mouth: Mucous membranes are moist.      Pharynx: Oropharynx is clear.   Eyes:      Pupils: Pupils are equal, round, and reactive to light.   Cardiovascular:      Rate and Rhythm: Normal rate and regular rhythm.      Pulses: Normal pulses.      Heart sounds: Normal heart sounds.   Pulmonary:      Effort: Pulmonary effort is normal.      Breath sounds: Normal breath sounds.   Abdominal:      General: Bowel sounds are normal.      Palpations: Abdomen is soft.   Musculoskeletal:         " General: Normal range of motion.      Cervical back: Normal range of motion.   Skin:     General: Skin is warm and dry.   Neurological:      General: No focal deficit present.      Mental Status: She is alert and oriented to person, place, and time.   Psychiatric:         Mood and Affect: Mood normal.         Behavior: Behavior normal.         Thought Content: Thought content normal.         Judgment: Judgment normal.         Assessment/Plan   Problem List Items Addressed This Visit       Depressive disorder    Patient following with physiatrist. On duloxitine 60mg. Endorses is okay, no SI/HI thoughts. PHQ and DEENA both 0 today in office.          Positive antinuclear antibody    AFIA was postive 1:320 with nonspecific TYSON. Awaiting rheumatology appointment.          Obstructive sleep apnea syndrome - Primary    Has been using CPAP, was set up by company and has been working well for patient. No concerns.         Relevant Medications    tirzepatide, weight loss, (Zepbound) 2.5 mg/0.5 mL injection    tirzepatide, weight loss, (Zepbound) 5 mg/0.5 mL injection (Start on 6/16/2025)    Other specified anxiety disorders    Patient following with physiatrist. On duloxitine 60mg. Endorses is okay, no SI/HI thoughts. PHQ and DEENA both 0 today in office.          Grief    Class 3 severe obesity due to excess calories without serious comorbidity with body mass index (BMI) of 40.0 to 44.9 in adult    Remains concerned about weight gain, unable to loose weight. BMI is 42. Does intermittent fasting. Has been more active at home, 43929 steps daily. Cut out high carb and sugar. Diet and exercise discussed.   Wegovy was too expensive, will order zepbound. It has been indicated with obesity and RHIANNON which patient has.          Relevant Medications    tirzepatide, weight loss, (Zepbound) 2.5 mg/0.5 mL injection    tirzepatide, weight loss, (Zepbound) 5 mg/0.5 mL injection (Start on 6/16/2025)    Other Relevant Orders    Follow Up In  [facility] - PCP - Established    Health care maintenance    Mammogram ordered 6/11/25   Colonoscopy Due 2032          Other Visit Diagnoses         Encounter for weight loss counseling        Relevant Orders    Follow Up In [facility] - PCP - Established      Encounter for screening mammogram for malignant neoplasm of breast        Relevant Orders    BI mammo bilateral screening tomosynthesis           Patient Counseling:  --Nutrition: Stressed importance of moderation in sodium/caffeine intake, saturated fat and cholesterol, caloric balance, sufficient intake of fresh fruits, vegetables, fiber, calcium, iron  --Exercise: Stressed the importance of regular exercise.   --Substance Abuse: Discussed cessation/primary prevention of tobacco, alcohol, or other drug use; driving or other dangerous activities under the influence; availability of treatment for abuse.    --Sexuality: Discussed sexually transmitted diseases, partner selection, use of condoms, avoidance of unintended pregnancy  and contraceptive alternatives.   --Injury prevention: Discussed safety belts, safety helmets, smoke detector, smoking near bedding or upholstery.   --Dental health: Discussed importance of regular tooth brushing, flossing, and dental visits.  --Immunizations reviewed.  --Discussed benefits of screening colonoscopy.  --After hours service discussed with patient]

## 2025-05-27 NOTE — ASSESSMENT & PLAN NOTE
Mammogram ordered 6/11/25   Colonoscopy Due 2032    Anticipatory guidance, age-appropriate vaccines, routine screenings exams, health promotion and prevention discussed

## 2025-05-27 NOTE — PATIENT INSTRUCTIONS
[facility]  [place], [address], [place] [address]     [facility]  [phone]      Weight loss, low-carb diet and increase activities advised.  Continue current medications as prescribed.

## 2025-05-27 NOTE — ASSESSMENT & PLAN NOTE
Remains concerned about weight gain, unable to loose weight. BMI is 42. Does intermittent fasting. Has been more active at home, 92064 steps daily. Cut out high carb and sugar. Diet and exercise discussed.   Wegovy was too expensive, will order zepbound. It has been indicated with obesity and RHIANNON which patient has.

## 2025-06-11 ENCOUNTER — HOSPITAL ENCOUNTER (OUTPATIENT)
Dept: RADIOLOGY | Facility: HOSPITAL | Age: 45
Discharge: HOME | End: 2025-06-11
Payer: COMMERCIAL

## 2025-06-11 VITALS — HEIGHT: 67 IN | WEIGHT: 286 LBS | BODY MASS INDEX: 44.89 KG/M2

## 2025-06-11 DIAGNOSIS — Z12.31 ENCOUNTER FOR SCREENING MAMMOGRAM FOR MALIGNANT NEOPLASM OF BREAST: ICD-10-CM

## 2025-06-11 DIAGNOSIS — E66.813 CLASS 3 SEVERE OBESITY DUE TO EXCESS CALORIES WITHOUT SERIOUS COMORBIDITY WITH BODY MASS INDEX (BMI) OF 40.0 TO 44.9 IN ADULT: ICD-10-CM

## 2025-06-11 DIAGNOSIS — G47.33 OBSTRUCTIVE SLEEP APNEA SYNDROME: ICD-10-CM

## 2025-06-11 PROCEDURE — 77067 SCR MAMMO BI INCL CAD: CPT

## 2025-06-17 ENCOUNTER — HOSPITAL ENCOUNTER (OUTPATIENT)
Dept: RADIOLOGY | Facility: EXTERNAL LOCATION | Age: 45
Discharge: HOME | End: 2025-06-17

## 2025-06-17 DIAGNOSIS — Z12.31 ENCOUNTER FOR SCREENING MAMMOGRAM FOR MALIGNANT NEOPLASM OF BREAST: ICD-10-CM

## 2025-06-18 ENCOUNTER — TELEPHONE (OUTPATIENT)
Dept: PRIMARY CARE | Facility: CLINIC | Age: 45
End: 2025-06-18
Payer: COMMERCIAL

## 2025-06-18 NOTE — TELEPHONE ENCOUNTER
----- Message from Melissa Moreno sent at 6/17/2025  7:00 PM EDT -----  Mammogram normal, recheck yearly as recommended.     ----- Message -----  From: Interface, Radiology Results In  Sent: 6/17/2025   3:37 PM EDT  To: YOSEPH Ward-CHERISE

## 2025-07-08 ENCOUNTER — TELEPHONE (OUTPATIENT)
Dept: PRIMARY CARE | Facility: CLINIC | Age: 45
End: 2025-07-08
Payer: COMMERCIAL

## 2025-07-08 DIAGNOSIS — L23.7 POISON IVY: Primary | ICD-10-CM

## 2025-07-08 RX ORDER — METHYLPREDNISOLONE 4 MG/1
TABLET ORAL
Qty: 21 TABLET | Refills: 0 | Status: SHIPPED | OUTPATIENT
Start: 2025-07-08 | End: 2025-07-14

## 2025-07-08 NOTE — TELEPHONE ENCOUNTER
Patient called in stating that she has poison ivy across her face, eyes and neck, cleavage  burning and itching.  Can she get a steroid shot or something that will help?  [facility] in [place]

## 2025-07-15 DIAGNOSIS — E66.813 CLASS 3 SEVERE OBESITY DUE TO EXCESS CALORIES WITHOUT SERIOUS COMORBIDITY WITH BODY MASS INDEX (BMI) OF 40.0 TO 44.9 IN ADULT: ICD-10-CM

## 2025-07-15 DIAGNOSIS — G47.33 OBSTRUCTIVE SLEEP APNEA SYNDROME: ICD-10-CM

## 2025-07-15 NOTE — TELEPHONE ENCOUNTER
Patient called to request medication refill.    Last appointment with our providers: 05/27/2025    Next appointment with our providers: 08/26/2025    Name of Medication:tirzepatide, weight loss, (Zepbound) 5 mg/0.5 mL injection     Pharmacy: [facility] - [place] [id number]  Phone: [phone]  Fax: [phone]

## 2025-08-26 ENCOUNTER — APPOINTMENT (OUTPATIENT)
Dept: PRIMARY CARE | Facility: CLINIC | Age: 45
End: 2025-08-26
Payer: COMMERCIAL

## 2025-08-26 VITALS
DIASTOLIC BLOOD PRESSURE: 73 MMHG | SYSTOLIC BLOOD PRESSURE: 103 MMHG | OXYGEN SATURATION: 96 % | HEIGHT: 67 IN | HEART RATE: 81 BPM | BODY MASS INDEX: 43.16 KG/M2 | WEIGHT: 275 LBS

## 2025-08-26 DIAGNOSIS — E66.01 MORBID OBESITY (MULTI): ICD-10-CM

## 2025-08-26 DIAGNOSIS — E66.813 CLASS 3 SEVERE OBESITY DUE TO EXCESS CALORIES WITHOUT SERIOUS COMORBIDITY WITH BODY MASS INDEX (BMI) OF 40.0 TO 44.9 IN ADULT: ICD-10-CM

## 2025-08-26 DIAGNOSIS — R11.0 NAUSEA: Primary | ICD-10-CM

## 2025-08-26 DIAGNOSIS — Z71.3 ENCOUNTER FOR WEIGHT LOSS COUNSELING: ICD-10-CM

## 2025-08-26 PROCEDURE — 99214 OFFICE O/P EST MOD 30 MIN: CPT

## 2025-08-26 PROCEDURE — 1036F TOBACCO NON-USER: CPT

## 2025-08-26 PROCEDURE — 3008F BODY MASS INDEX DOCD: CPT

## 2025-08-26 RX ORDER — ONDANSETRON 4 MG/1
4 TABLET, ORALLY DISINTEGRATING ORAL EVERY 8 HOURS PRN
Qty: 20 TABLET | Refills: 2 | Status: SHIPPED | OUTPATIENT
Start: 2025-08-26 | End: 2025-09-25

## 2025-08-26 RX ORDER — SODIUM FLUORIDE1.1%, POTASSIUM NITRATE 5% 5.8; 57.5 MG/ML; MG/ML
GEL, DENTIFRICE DENTAL
COMMUNITY
Start: 2025-08-18

## 2025-08-26 ASSESSMENT — PATIENT HEALTH QUESTIONNAIRE - PHQ9
1. LITTLE INTEREST OR PLEASURE IN DOING THINGS: MORE THAN HALF THE DAYS
3. TROUBLE FALLING OR STAYING ASLEEP OR SLEEPING TOO MUCH: NEARLY EVERY DAY
SUM OF ALL RESPONSES TO PHQ9 QUESTIONS 1 AND 2: 4
2. FEELING DOWN, DEPRESSED OR HOPELESS: MORE THAN HALF THE DAYS
6. FEELING BAD ABOUT YOURSELF - OR THAT YOU ARE A FAILURE OR HAVE LET YOURSELF OR YOUR FAMILY DOWN: NOT AT ALL
8. MOVING OR SPEAKING SO SLOWLY THAT OTHER PEOPLE COULD HAVE NOTICED. OR THE OPPOSITE, BEING SO FIGETY OR RESTLESS THAT YOU HAVE BEEN MOVING AROUND A LOT MORE THAN USUAL: NEARLY EVERY DAY
7. TROUBLE CONCENTRATING ON THINGS, SUCH AS READING THE NEWSPAPER OR WATCHING TELEVISION: NEARLY EVERY DAY
5. POOR APPETITE OR OVEREATING: SEVERAL DAYS
9. THOUGHTS THAT YOU WOULD BE BETTER OFF DEAD, OR OF HURTING YOURSELF: NOT AT ALL
4. FEELING TIRED OR HAVING LITTLE ENERGY: NEARLY EVERY DAY
SUM OF ALL RESPONSES TO PHQ QUESTIONS 1-9: 17

## 2025-08-26 ASSESSMENT — ENCOUNTER SYMPTOMS
DYSPHORIC MOOD: 1
ENDOCRINE NEGATIVE: 1
EYES NEGATIVE: 1
RESPIRATORY NEGATIVE: 1
CARDIOVASCULAR NEGATIVE: 1
OCCASIONAL FEELINGS OF UNSTEADINESS: 0
ALLERGIC/IMMUNOLOGIC NEGATIVE: 1
MUSCULOSKELETAL NEGATIVE: 1
NEUROLOGICAL NEGATIVE: 1
HEMATOLOGIC/LYMPHATIC NEGATIVE: 1
DEPRESSION: 0
LOSS OF SENSATION IN FEET: 0
NAUSEA: 1
CONSTITUTIONAL NEGATIVE: 1

## 2025-08-29 ENCOUNTER — OFFICE VISIT (OUTPATIENT)
Dept: RHEUMATOLOGY | Facility: CLINIC | Age: 45
End: 2025-08-29
Payer: COMMERCIAL

## 2025-08-29 VITALS
HEIGHT: 61 IN | OXYGEN SATURATION: 97 % | DIASTOLIC BLOOD PRESSURE: 93 MMHG | SYSTOLIC BLOOD PRESSURE: 131 MMHG | BODY MASS INDEX: 51.35 KG/M2 | HEART RATE: 68 BPM | WEIGHT: 272 LBS

## 2025-08-29 DIAGNOSIS — M79.7 FIBROMYALGIA: ICD-10-CM

## 2025-08-29 DIAGNOSIS — R76.8 POSITIVE ANA (ANTINUCLEAR ANTIBODY): Primary | ICD-10-CM

## 2025-08-29 PROCEDURE — 1036F TOBACCO NON-USER: CPT | Performed by: INTERNAL MEDICINE

## 2025-08-29 PROCEDURE — 3008F BODY MASS INDEX DOCD: CPT | Performed by: INTERNAL MEDICINE

## 2025-08-29 PROCEDURE — 99204 OFFICE O/P NEW MOD 45 MIN: CPT | Performed by: INTERNAL MEDICINE

## 2025-08-29 PROCEDURE — 99214 OFFICE O/P EST MOD 30 MIN: CPT | Performed by: INTERNAL MEDICINE

## 2025-08-29 RX ORDER — TOPIRAMATE 25 MG/1
25 TABLET, FILM COATED ORAL 2 TIMES DAILY
Qty: 60 TABLET | Refills: 11 | Status: SHIPPED | OUTPATIENT
Start: 2025-08-29 | End: 2025-08-29 | Stop reason: SDUPTHER

## 2025-08-29 RX ORDER — TOPIRAMATE 25 MG/1
25 TABLET, FILM COATED ORAL NIGHTLY
Qty: 30 TABLET | Refills: 2 | Status: SHIPPED | OUTPATIENT
Start: 2025-08-29 | End: 2025-11-27

## 2025-08-29 ASSESSMENT — RHEUMATOLOGY NEW PATIENT QUESTIONNAIRE
NAUSEA: Y
DIFFICULTY FALLING ASLEEP: Y
SUN SENSITIVE (SUN ALLERGY): Y
HOW WOULD YOU DESCRIBE YOUR STIFFNESS ON AVERAGE: MODERATE
EASILY LOSING TEMPER: N
JOINT SWELLING: N
NUMBNESS OR TINGLING IN HANDS OR FEET: Y
BLOOD IN STOOLS: N
DOUBLE OR BLURRED VISION: N
UNUSUALLY RAPID OR SLOWED HEART RATE: N
SORES IN MOUTH OR NOSE: N
UNEXPLAINED HEARING LOSS: N
DRYNESS OF MOUTH: N
CHEST PAIN: N
HEARTBURN OR REFLUX: Y
BLACK STOOLS: N
EXCESSIVE HAIR LOSS (MORE THAN YOUR NORM): Y
MEMORY LOSS: Y
RASH: N
UNUSUAL FATIGUE: N
FAINTING: N
NIGHT SWEATS: N
PAIN OR BURNING ON URINATION: N
STOMACH PAIN: Y
SWOLLEN LEGS OR FEET: Y
SEIZURES: N
MORNING STIFFNESS IN LOWER BACK: Y
COLOR CHANGES OF HANDS OR FEET IN THE COLD: N
BEHAVIORAL CHANGES: N
COUGH: N
LOSS OF CONSCIOUSNESS: N
SWOLLEN OR TENDER GLANDS: N
DIFFICULTY SWALLOWING: N
RASH OR ULCERS: N
AGITATION: N
DIFFICULTY BREATHING LYING DOWN: N
DIFFICULTY STAYING ASLEEP: Y
VAGINAL DRYNESS: N
JOINT PAIN: N
ABNORMAL URINE: N
UNUSUAL BLEEDING: N
EYE DRYNESS: Y
NODULES/BUMPS: N
DEPRESSION: Y
ANXIETY: Y
UNEXPLAINED WEIGHT CHANGE: N
JAUNDICE: N
SHORTNESS OF BREATH: N
MORNING STIFFNESS: Y
ANEMIA: Y
HEADACHES: Y
SKIN TIGHTNESS: N
EYE PAIN: Y
EYE REDNESS: N
MUSCLE WEAKNESS: N
EASY BRUISING: Y
PERSISTENT DIARRHEA: N
LOSS OF VISION: N
VOMITING OF BLOOD OR COFFEE GROUND CONSISTENCY MATERIAL: N
HOARSE VOICE: N
FEVER: N
SKIN REDNESS: Y
INCREASED SUSCEPTIBILITY TO INFECTION: N

## 2025-08-29 ASSESSMENT — ENCOUNTER SYMPTOMS
SLEEP DISTURBANCE: 1
APNEA: 1
NAUSEA: 1
HEADACHES: 1
NERVOUS/ANXIOUS: 1

## 2025-08-29 ASSESSMENT — PAIN SCALES - GENERAL: PAINLEVEL_OUTOF10: 3

## 2025-10-15 ENCOUNTER — APPOINTMENT (OUTPATIENT)
Dept: PRIMARY CARE | Facility: CLINIC | Age: 45
End: 2025-10-15
Payer: COMMERCIAL

## 2025-11-26 ENCOUNTER — APPOINTMENT (OUTPATIENT)
Dept: PRIMARY CARE | Facility: CLINIC | Age: 45
End: 2025-11-26
Payer: COMMERCIAL